# Patient Record
Sex: MALE | Race: WHITE | Employment: FULL TIME | URBAN - METROPOLITAN AREA
[De-identification: names, ages, dates, MRNs, and addresses within clinical notes are randomized per-mention and may not be internally consistent; named-entity substitution may affect disease eponyms.]

---

## 2017-05-03 ENCOUNTER — HOSPITAL ENCOUNTER (OUTPATIENT)
Dept: ORTHOPEDIC SURGERY | Age: 47
Discharge: HOME | End: 2017-05-03
Payer: COMMERCIAL

## 2017-05-03 DIAGNOSIS — M17.12 LOCALIZED PRIMARY OSTEOARTHRITIS OF LOWER LEG, LEFT: ICD-10-CM

## 2017-05-03 PROCEDURE — 73560 X-RAY EXAM OF KNEE 1 OR 2: CPT

## 2017-10-30 ENCOUNTER — OFFICE VISIT (OUTPATIENT)
Dept: FAMILY MEDICINE CLINIC | Age: 47
End: 2017-10-30

## 2017-10-30 VITALS
BODY MASS INDEX: 28.9 KG/M2 | RESPIRATION RATE: 20 BRPM | DIASTOLIC BLOOD PRESSURE: 80 MMHG | SYSTOLIC BLOOD PRESSURE: 120 MMHG | TEMPERATURE: 97.9 F | HEART RATE: 79 BPM | HEIGHT: 69 IN | WEIGHT: 195.1 LBS

## 2017-10-30 DIAGNOSIS — B07.9 VIRAL WARTS, UNSPECIFIED TYPE: Primary | ICD-10-CM

## 2017-10-30 DIAGNOSIS — Z23 NEED FOR INFLUENZA VACCINATION: ICD-10-CM

## 2017-10-30 PROCEDURE — 17000 DESTRUCT PREMALG LESION: CPT | Performed by: FAMILY MEDICINE

## 2017-10-30 ASSESSMENT — PATIENT HEALTH QUESTIONNAIRE - PHQ9
2. FEELING DOWN, DEPRESSED OR HOPELESS: 0
SUM OF ALL RESPONSES TO PHQ QUESTIONS 1-9: 0
1. LITTLE INTEREST OR PLEASURE IN DOING THINGS: 0
SUM OF ALL RESPONSES TO PHQ9 QUESTIONS 1 & 2: 0

## 2017-10-30 NOTE — PROGRESS NOTES
Subjective:      Patient ID: Alyssa Iverson is a 41 y.o. male. Chief Complaint   Patient presents with    Verruca Vulgaris     patient is presen today for Wart on Right thumb x 1 yr has tried burning it off no relief, has also tried OTC no relief, does have with pressure. HPI   Here today for a mass in his right thumb consult with a wart he's tried to have an burnt off by the Coumadin clinic several times states his current continues to come back once daily treated here today by us know otherwise he knows of      No Known Allergies  Outpatient Encounter Prescriptions as of 10/30/2017   Medication Sig Dispense Refill    [DISCONTINUED] ibuprofen (ADVIL;MOTRIN) 200 MG tablet Take 800 mg by mouth 2 times daily as needed for Pain      [DISCONTINUED] predniSONE (DELTASONE) 10 MG tablet Take 5 tabs x 4days then 4 x 4, 3 x 4, 2 x 4, 1 x 4 60 tablet 0     No facility-administered encounter medications on file as of 10/30/2017.       Social History     Social History    Marital status:      Spouse name: Genora Lesch     Number of children: 2    Years of education: N/A     Occupational History     [employer]     Social History Main Topics    Smoking status: Never Smoker    Smokeless tobacco: Never Used    Alcohol use Yes      Comment: 4-6    Drug use: No    Sexual activity: Yes     Partners: Female     Other Topics Concern    Not on file     Social History Narrative    No narrative on file     Family History   Problem Relation Age of Onset    Lupus Mother     Mental Illness Mother     High Blood Pressure Father     No Known Problems Sister     Asthma Son      Past Medical History:   Diagnosis Date    Arthritis     Knee pain, left 2/16/2015     Past Surgical History:   Procedure Laterality Date    ARTHROSCOPY / ARTHROTOMY KNEE Left 11/11/2016    LEFT KNEE ARTHROSCOPY / MEDIAL MENISECTOMY performed by Annel Ann MD at [address]

## 2017-11-30 ENCOUNTER — OFFICE VISIT (OUTPATIENT)
Dept: FAMILY MEDICINE CLINIC | Age: 47
End: 2017-11-30

## 2017-11-30 VITALS
BODY MASS INDEX: 29.03 KG/M2 | RESPIRATION RATE: 12 BRPM | HEART RATE: 70 BPM | HEIGHT: 69 IN | DIASTOLIC BLOOD PRESSURE: 86 MMHG | WEIGHT: 196 LBS | OXYGEN SATURATION: 96 % | SYSTOLIC BLOOD PRESSURE: 132 MMHG | TEMPERATURE: 97 F

## 2017-11-30 DIAGNOSIS — B07.8 OTHER VIRAL WARTS: Primary | ICD-10-CM

## 2017-11-30 DIAGNOSIS — Z23 NEEDS FLU SHOT: ICD-10-CM

## 2017-11-30 PROCEDURE — 17000 DESTRUCT PREMALG LESION: CPT | Performed by: FAMILY MEDICINE

## 2017-11-30 PROCEDURE — 90688 IIV4 VACCINE SPLT 0.5 ML IM: CPT | Performed by: FAMILY MEDICINE

## 2017-11-30 PROCEDURE — 90471 IMMUNIZATION ADMIN: CPT | Performed by: FAMILY MEDICINE

## 2017-12-27 ENCOUNTER — OFFICE VISIT (OUTPATIENT)
Dept: FAMILY MEDICINE CLINIC | Age: 47
End: 2017-12-27

## 2017-12-27 VITALS
HEIGHT: 69 IN | HEART RATE: 96 BPM | WEIGHT: 200.25 LBS | SYSTOLIC BLOOD PRESSURE: 118 MMHG | DIASTOLIC BLOOD PRESSURE: 80 MMHG | BODY MASS INDEX: 29.66 KG/M2 | TEMPERATURE: 97.2 F | RESPIRATION RATE: 16 BRPM | OXYGEN SATURATION: 96 %

## 2017-12-27 DIAGNOSIS — B07.8 OTHER VIRAL WARTS: Primary | ICD-10-CM

## 2017-12-27 PROCEDURE — 17110 DESTRUCTION B9 LES UP TO 14: CPT | Performed by: FAMILY MEDICINE

## 2017-12-27 ASSESSMENT — ENCOUNTER SYMPTOMS
ABDOMINAL PAIN: 0
SHORTNESS OF BREATH: 0
EYES NEGATIVE: 1
COUGH: 0
DIARRHEA: 0
CONSTIPATION: 0
NAUSEA: 0

## 2017-12-27 NOTE — PROGRESS NOTES
Female     Other Topics Concern    Not on file     Social History Narrative    No narrative on file     Family History   Problem Relation Age of Onset    Lupus Mother     Mental Illness Mother     High Blood Pressure Father     No Known Problems Sister     Asthma Son      No Known Allergies  Current Outpatient Prescriptions   Medication Sig Dispense Refill    Respiratory Therapy Supplies LUIS ANTONIO CPAP Supplies - needs mask, tubing, and filters. 2 Device 2     No current facility-administered medications for this visit. PMH, Surgical Hx, Family Hx, and Social Hx reviewed and updated. Health Maintenance reviewed. Objective    Vitals:    12/27/17 0751   BP: 118/80   Site: Left Arm   Position: Sitting   Cuff Size: Medium Adult   Pulse: 96   Resp: 16   Temp: 97.2 °F (36.2 °C)   TempSrc: Temporal   SpO2: 96%   Weight: 200 lb 4 oz (90.8 kg)   Height: 5' 9\" (1.753 m)       Physical Exam   Cardiovascular: Normal rate, regular rhythm and normal heart sounds. Pulmonary/Chest: Breath sounds normal.   Skin:   Small wart right thumb treated with liquid nitrogen in a freeze thaw freeze method. Tolerated well     Assessment & Plan   1. Other viral warts  UT DESTRUCTION BENIGN LESIONS UP TO 14     Orders Placed This Encounter   Procedures    UT DESTRUCTION BENIGN LESIONS UP TO 14       Long talk. Wart treatment reviewed. Okay to use Tylenol when necessary    Follow up as instructed. Call if any problems  Wellness visit in 2-3 weeks recommended  Controlled Substances Monitoring:          Joaquin Lopez MD          Please note this report has been partially produced using speech recognition software  And may cause contain errors related to that system including grammar, punctuation and spelling as well as words and phrases that may seem inappropriate. If there are questions or concerns please feel free to contact me to clarify.

## 2018-01-10 ENCOUNTER — OFFICE VISIT (OUTPATIENT)
Dept: FAMILY MEDICINE CLINIC | Age: 48
End: 2018-01-10

## 2018-01-10 VITALS
WEIGHT: 195 LBS | BODY MASS INDEX: 28.88 KG/M2 | HEIGHT: 69 IN | SYSTOLIC BLOOD PRESSURE: 110 MMHG | DIASTOLIC BLOOD PRESSURE: 84 MMHG | OXYGEN SATURATION: 96 % | HEART RATE: 65 BPM

## 2018-01-10 DIAGNOSIS — Z00.00 ENCOUNTER FOR GENERAL ADULT MEDICAL EXAMINATION W/O ABNORMAL FINDINGS: Primary | ICD-10-CM

## 2018-01-10 DIAGNOSIS — Z00.00 ENCOUNTER FOR GENERAL ADULT MEDICAL EXAMINATION W/O ABNORMAL FINDINGS: ICD-10-CM

## 2018-01-10 LAB
ALBUMIN SERPL-MCNC: 4.5 G/DL (ref 3.9–4.9)
ALP BLD-CCNC: 63 U/L (ref 35–104)
ALT SERPL-CCNC: 43 U/L (ref 0–41)
ANION GAP SERPL CALCULATED.3IONS-SCNC: 16 MEQ/L (ref 7–13)
ANISOCYTOSIS: ABNORMAL
AST SERPL-CCNC: 23 U/L (ref 0–40)
ATYPICAL LYMPHOCYTE RELATIVE PERCENT: 13 %
BASOPHILS ABSOLUTE: 0 K/UL (ref 0–0.2)
BASOPHILS RELATIVE PERCENT: 0.5 %
BILIRUB SERPL-MCNC: 0.4 MG/DL (ref 0–1.2)
BUN BLDV-MCNC: 15 MG/DL (ref 6–20)
CALCIUM SERPL-MCNC: 9.3 MG/DL (ref 8.6–10.2)
CHLORIDE BLD-SCNC: 99 MEQ/L (ref 98–107)
CHOLESTEROL, TOTAL: 191 MG/DL (ref 0–199)
CO2: 25 MEQ/L (ref 22–29)
CREAT SERPL-MCNC: 0.73 MG/DL (ref 0.7–1.2)
EOSINOPHILS ABSOLUTE: 0.1 K/UL (ref 0–0.7)
EOSINOPHILS RELATIVE PERCENT: 1 %
GFR AFRICAN AMERICAN: >60
GFR NON-AFRICAN AMERICAN: >60
GLOBULIN: 2.9 G/DL (ref 2.3–3.5)
GLUCOSE BLD-MCNC: 95 MG/DL (ref 74–109)
HCT VFR BLD CALC: 44.7 % (ref 42–52)
HDLC SERPL-MCNC: 68 MG/DL (ref 40–59)
HEMOGLOBIN: 14 G/DL (ref 14–18)
HYPOCHROMIA: ABNORMAL
LDL CHOLESTEROL CALCULATED: 104 MG/DL (ref 0–129)
LYMPHOCYTES ABSOLUTE: 2.3 K/UL (ref 1–4.8)
LYMPHOCYTES RELATIVE PERCENT: 24 %
MCH RBC QN AUTO: 22.1 PG (ref 27–31.3)
MCHC RBC AUTO-ENTMCNC: 31.2 % (ref 33–37)
MCV RBC AUTO: 70.7 FL (ref 80–100)
METAMYELOCYTES RELATIVE PERCENT: 1 %
MICROCYTES: ABNORMAL
MONOCYTES ABSOLUTE: 0.4 K/UL (ref 0.2–0.8)
MONOCYTES RELATIVE PERCENT: 5.9 %
NEUTROPHILS ABSOLUTE: 3.5 K/UL (ref 1.4–6.5)
NEUTROPHILS RELATIVE PERCENT: 56 %
PDW BLD-RTO: 15.3 % (ref 11.5–14.5)
PLATELET # BLD: 178 K/UL (ref 130–400)
PLATELET SLIDE REVIEW: ADEQUATE
POTASSIUM SERPL-SCNC: 4.9 MEQ/L (ref 3.5–5.1)
RBC # BLD: 6.32 M/UL (ref 4.7–6.1)
SMUDGE CELLS: 2.9
SODIUM BLD-SCNC: 140 MEQ/L (ref 132–144)
TOTAL PROTEIN: 7.4 G/DL (ref 6.4–8.1)
TRIGL SERPL-MCNC: 95 MG/DL (ref 0–200)
WBC # BLD: 6.1 K/UL (ref 4.8–10.8)

## 2018-01-10 PROCEDURE — 99396 PREV VISIT EST AGE 40-64: CPT | Performed by: FAMILY MEDICINE

## 2018-01-10 ASSESSMENT — ENCOUNTER SYMPTOMS
DIARRHEA: 0
NAUSEA: 0
COUGH: 0
SHORTNESS OF BREATH: 0
CONSTIPATION: 0
ABDOMINAL PAIN: 0
EYES NEGATIVE: 1

## 2018-01-10 NOTE — PROGRESS NOTES
Subjective  Eleanora Lanes, 41 y.o. male presents today with:  Chief Complaint   Patient presents with    Annual Exam     pt does not exercise daily, watches his diet, pt is fasting for BW           HPI    Patient presents today for a Wellness visit  Taking current medications which were reviewed. Problem list discussed. Eating okay. Watches his diet. Does not exercise regularly we spent some time talking about that    Overall doing well. Has 1 new problem / question. Would like wart rechecked on his thumb which was treated a few weeks ago. Seems to be doing better    Health Maintenance Is Up-To-Date          No other questions and or concerns for today's visit      Review of Systems   Constitutional: Negative for activity change, appetite change, fatigue and fever. HENT: Negative for ear pain. Eyes: Negative. Respiratory: Negative for cough and shortness of breath. Cardiovascular: Negative for chest pain and palpitations. Gastrointestinal: Negative for abdominal pain, constipation, diarrhea and nausea. Genitourinary: Negative for dysuria and frequency. Neurological: Negative for dizziness and headaches.          Past Medical History:   Diagnosis Date    Arthritis     Knee pain, left 2/16/2015     Past Surgical History:   Procedure Laterality Date    ARTHROSCOPY / ARTHROTOMY KNEE Left 11/11/2016    LEFT KNEE ARTHROSCOPY / MEDIAL MENISECTOMY performed by Rick Moser MD at [address] ARTHROSCOPY      OTHER SURGICAL HISTORY  12/08/14    DR WHEELER   LT THUMB MP JOINT ARTHRODES'S    ROTATOR CUFF REPAIR      ROTATOR CUFF REPAIR Right     WRIST SURGERY Left      Social History     Social History    Marital status:      Spouse name: Jeane Escobar     Number of children: 2    Years of education: N/A     Occupational History     [employer]     Social History Main Topics    Smoking status: Never Smoker    Smokeless tobacco: Never Used  Alcohol use Yes      Comment: 4-6    Drug use: No    Sexual activity: Yes     Partners: Female     Other Topics Concern    Not on file     Social History Narrative    No narrative on file     Family History   Problem Relation Age of Onset    Lupus Mother     Mental Illness Mother     High Blood Pressure Father     No Known Problems Sister     Asthma Son      No Known Allergies  Current Outpatient Prescriptions   Medication Sig Dispense Refill    Respiratory Therapy Supplies LUIS ANTONIO CPAP Supplies - needs mask, tubing, and filters. 2 Device 2     No current facility-administered medications for this visit. PMH, Surgical Hx, Family Hx, and Social Hx reviewed and updated. Health Maintenance reviewed. Objective    Vitals:    01/10/18 0749   BP: 110/84   Pulse: 65   SpO2: 96%   Weight: 195 lb (88.5 kg)   Height: 5' 9\" (1.753 m)       Physical Exam   Constitutional: He appears well-nourished. No distress. HENT:   Right Ear: External ear normal.   Left Ear: External ear normal.   Nose: Nose normal.   Mouth/Throat: Oropharynx is clear and moist.   Eyes: Conjunctivae are normal. Pupils are equal, round, and reactive to light. Neck: Neck supple. Carotid bruit is not present. No thyromegaly present. Cardiovascular: Normal rate, regular rhythm, normal heart sounds and normal pulses. No murmur heard. Pulmonary/Chest: Effort normal and breath sounds normal. He has no wheezes. Abdominal: Soft. Bowel sounds are normal. He exhibits no mass. There is no hepatomegaly. There is no tenderness. Musculoskeletal: Normal range of motion. He exhibits no edema. Lymphadenopathy:     He has no cervical adenopathy. Neurological: He is alert. Skin: Skin is warm. No rash noted. No cyanosis. Small wart right thumb treated with liquid nitrogen in a freeze thaw freeze method. Tolerated well   Psychiatric: He has a normal mood and affect.  His speech is normal and behavior is normal.       Assessment & Plan 1. Encounter for general adult medical examination w/o abnormal findings  Comprehensive Metabolic Panel    CBC With Auto Differential    Lipid Panel     Orders Placed This Encounter   Procedures    Comprehensive Metabolic Panel     Standing Status:   Future     Number of Occurrences:   1     Standing Expiration Date:   1/10/2019    CBC With Auto Differential     Standing Status:   Future     Number of Occurrences:   1     Standing Expiration Date:   1/10/2019    Lipid Panel     Standing Status:   Future     Number of Occurrences:   1     Standing Expiration Date:   1/10/2019     Order Specific Question:   Is Patient Fasting?/# of Hours     Answer:   yes     No orders of the defined types were placed in this encounter. Long talk. Health maintenance issues reviewed and discussed with recommendations made. The importance of a good diet and exercise regimen discussed. Take medications as prescribed. Follow up as instructed. Call if any problems  Controlled Substances Monitoring:          Komal Yap MD          Please note this report has been partially produced using speech recognition software  And may cause contain errors related to that system including grammar, punctuation and spelling as well as words and phrases that may seem inappropriate. If there are questions or concerns please feel free to contact me to clarify.

## 2018-07-06 ENCOUNTER — TELEPHONE (OUTPATIENT)
Dept: FAMILY MEDICINE CLINIC | Age: 48
End: 2018-07-06

## 2018-07-06 DIAGNOSIS — M77.32 HEEL SPUR, LEFT: Primary | ICD-10-CM

## 2018-07-06 NOTE — TELEPHONE ENCOUNTER
Lmom for pt that referral was placed and that he can call Dr Cristhian Alex to set up appt (they will call him too)

## 2018-07-11 ENCOUNTER — OFFICE VISIT (OUTPATIENT)
Dept: PODIATRY | Age: 48
End: 2018-07-11
Payer: COMMERCIAL

## 2018-07-11 VITALS
RESPIRATION RATE: 14 BRPM | HEIGHT: 69 IN | BODY MASS INDEX: 28.88 KG/M2 | TEMPERATURE: 97.3 F | HEART RATE: 82 BPM | SYSTOLIC BLOOD PRESSURE: 130 MMHG | WEIGHT: 195 LBS | OXYGEN SATURATION: 97 % | DIASTOLIC BLOOD PRESSURE: 78 MMHG

## 2018-07-11 DIAGNOSIS — M79.672 CHRONIC HEEL PAIN, LEFT: Primary | ICD-10-CM

## 2018-07-11 DIAGNOSIS — G89.29 CHRONIC HEEL PAIN, LEFT: Primary | ICD-10-CM

## 2018-07-11 PROCEDURE — 99203 OFFICE O/P NEW LOW 30 MIN: CPT | Performed by: PODIATRIST

## 2018-07-11 RX ORDER — METHYLPREDNISOLONE 4 MG/1
TABLET ORAL
Qty: 1 KIT | Refills: 0 | Status: SHIPPED | OUTPATIENT
Start: 2018-07-11 | End: 2018-07-17

## 2018-07-11 RX ORDER — MELOXICAM 15 MG/1
15 TABLET ORAL DAILY
Qty: 30 TABLET | Refills: 3 | Status: SHIPPED | OUTPATIENT
Start: 2018-07-11 | End: 2019-02-19 | Stop reason: ALTCHOICE

## 2019-02-18 ENCOUNTER — TELEPHONE (OUTPATIENT)
Dept: FAMILY MEDICINE CLINIC | Age: 49
End: 2019-02-18

## 2019-02-19 ENCOUNTER — OFFICE VISIT (OUTPATIENT)
Dept: FAMILY MEDICINE CLINIC | Age: 49
End: 2019-02-19
Payer: COMMERCIAL

## 2019-02-19 VITALS
DIASTOLIC BLOOD PRESSURE: 86 MMHG | HEART RATE: 64 BPM | TEMPERATURE: 97.3 F | SYSTOLIC BLOOD PRESSURE: 124 MMHG | RESPIRATION RATE: 12 BRPM | HEIGHT: 69 IN | WEIGHT: 198.4 LBS | BODY MASS INDEX: 29.38 KG/M2

## 2019-02-19 DIAGNOSIS — M77.8 ELBOW TENDINITIS: Primary | ICD-10-CM

## 2019-02-19 DIAGNOSIS — M79.601 RIGHT ARM PAIN: ICD-10-CM

## 2019-02-19 DIAGNOSIS — B07.9 VIRAL WARTS, UNSPECIFIED TYPE: ICD-10-CM

## 2019-02-19 PROCEDURE — 99213 OFFICE O/P EST LOW 20 MIN: CPT | Performed by: FAMILY MEDICINE

## 2019-02-19 RX ORDER — METHYLPREDNISOLONE 4 MG/1
TABLET ORAL
Qty: 1 KIT | Refills: 0 | Status: SHIPPED | OUTPATIENT
Start: 2019-02-19 | End: 2021-02-25

## 2019-02-19 ASSESSMENT — PATIENT HEALTH QUESTIONNAIRE - PHQ9
SUM OF ALL RESPONSES TO PHQ9 QUESTIONS 1 & 2: 0
SUM OF ALL RESPONSES TO PHQ QUESTIONS 1-9: 0
2. FEELING DOWN, DEPRESSED OR HOPELESS: 0
1. LITTLE INTEREST OR PLEASURE IN DOING THINGS: 0
SUM OF ALL RESPONSES TO PHQ QUESTIONS 1-9: 0

## 2019-02-19 ASSESSMENT — ENCOUNTER SYMPTOMS
COUGH: 0
NAUSEA: 0
CONSTIPATION: 0
DIARRHEA: 0
EYES NEGATIVE: 1
ABDOMINAL PAIN: 0
SHORTNESS OF BREATH: 0

## 2021-02-25 ENCOUNTER — OFFICE VISIT (OUTPATIENT)
Dept: FAMILY MEDICINE CLINIC | Age: 51
End: 2021-02-25
Payer: COMMERCIAL

## 2021-02-25 VITALS
DIASTOLIC BLOOD PRESSURE: 74 MMHG | HEIGHT: 70 IN | WEIGHT: 197.4 LBS | OXYGEN SATURATION: 100 % | TEMPERATURE: 97.9 F | BODY MASS INDEX: 28.26 KG/M2 | HEART RATE: 77 BPM | SYSTOLIC BLOOD PRESSURE: 120 MMHG

## 2021-02-25 DIAGNOSIS — M72.2 PLANTAR FASCIITIS OF RIGHT FOOT: Primary | ICD-10-CM

## 2021-02-25 PROCEDURE — 99202 OFFICE O/P NEW SF 15 MIN: CPT | Performed by: FAMILY MEDICINE

## 2021-02-25 RX ORDER — METHYLPREDNISOLONE 4 MG/1
TABLET ORAL
Qty: 1 KIT | Refills: 0 | Status: SHIPPED | OUTPATIENT
Start: 2021-02-25 | End: 2021-03-03

## 2021-02-25 SDOH — ECONOMIC STABILITY: INCOME INSECURITY: HOW HARD IS IT FOR YOU TO PAY FOR THE VERY BASICS LIKE FOOD, HOUSING, MEDICAL CARE, AND HEATING?: NOT HARD AT ALL

## 2021-02-25 ASSESSMENT — ENCOUNTER SYMPTOMS
SORE THROAT: 0
DIARRHEA: 0
WHEEZING: 0
CONSTIPATION: 0
ABDOMINAL PAIN: 0
SHORTNESS OF BREATH: 0
RHINORRHEA: 0
COUGH: 0

## 2021-02-25 ASSESSMENT — PATIENT HEALTH QUESTIONNAIRE - PHQ9
SUM OF ALL RESPONSES TO PHQ QUESTIONS 1-9: 0
2. FEELING DOWN, DEPRESSED OR HOPELESS: 0
1. LITTLE INTEREST OR PLEASURE IN DOING THINGS: 0

## 2021-02-25 NOTE — PROGRESS NOTES
[address] [facility] [address] [facility]  [address] [address]  Dept: [phone]  Dept Fax: [phone]  Loc: [phone]     Chief Complaint  Chief Complaint   Patient presents with   Georgianna Olszewski [facility]    Foot Pain     x right, years, states he has a spur        HPI:  52 y.o.male who presents for the following:  (establish; was seeing Dr. 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 Has had injections and uses orthotics and seen podiatry. Review of Systems   Constitutional: Negative for chills and fever. HENT: Negative for congestion, rhinorrhea and sore throat. Respiratory: Negative for cough, shortness of breath and wheezing. Gastrointestinal: Negative for abdominal pain, constipation and diarrhea. Endocrine: Negative for polydipsia and polyuria. Genitourinary: Negative for dysuria, frequency and urgency. Neurological: Negative for syncope, light-headedness, numbness and headaches. Psychiatric/Behavioral: Negative for sleep disturbance. The patient is not nervous/anxious.         Past Medical History:   Diagnosis Date    Arthritis     Knee pain, left 2/16/2015     Past Surgical History:   Procedure Laterality Date    ARTHROSCOPY / ARTHROTOMY KNEE Left 11/11/2016    LEFT KNEE ARTHROSCOPY / MEDIAL MENISECTOMY performed by Vignesh Bhat MD at [address] ARTHROSCOPY      OTHER SURGICAL HISTORY  12/08/14    DR WHEELER   LT THUMB MP JOINT ARTHRODES'S    ROTATOR CUFF REPAIR      ROTATOR CUFF REPAIR Right     WRIST SURGERY Left      Social History     Socioeconomic History    Marital status:      Spouse name: Jasson Martínez     Number of children: 2    Years of education: Not on file    Highest education level: Not on file Occupational History     Employer: [employer]   Social Needs    Financial resource strain: Not hard at all   [place] insecurity     Worry: Never true     Inability: Never true   [employer] needs     Medical: No     Non-medical: No   Tobacco Use    Smoking status: Never Smoker    Smokeless tobacco: Never Used   Substance and Sexual Activity    Alcohol use: Yes     Comment: 4-6    Drug use: No    Sexual activity: Yes     Partners: Female   Lifestyle    Physical activity     Days per week: Not on file     Minutes per session: Not on file    Stress: Not on file   Relationships    Social connections     Talks on phone: Not on file     Gets together: Not on file     Attends Congregational service: Not on file     Active member of club or organization: Not on file     Attends meetings of clubs or organizations: Not on file     Relationship status: Not on file    Intimate partner violence     Fear of current or ex partner: Not on file     Emotionally abused: Not on file     Physically abused: Not on file     Forced sexual activity: Not on file   Other Topics Concern    Not on file   Social History Narrative    Not on file     Family History   Problem Relation Age of Onset    Lupus Mother     Mental Illness Mother     High Blood Pressure Father     No Known Problems Sister     Asthma Son       No Known Allergies  Current Outpatient Medications   Medication Sig Dispense Refill    methylPREDNISolone (MEDROL DOSEPACK) 4 MG tablet Take by mouth. 1 kit 0    Respiratory Therapy Supplies LUIS ANTONIO CPAP Supplies - needs mask, tubing, and filters. 2 Device 2     No current facility-administered medications for this visit.           Vitals:    02/25/21 0906   BP: 120/74   Site: Left Upper Arm   Position: Sitting   Cuff Size: Large Adult   Pulse: 77   Temp: 97.9 °F (36.6 °C)   TempSrc: Infrared   SpO2: 100%   Weight: 197 lb 6.4 oz (89.5 kg)   Height: 5' 9.75\" (1.772 m)       Physical exam:  Physical Exam  Vitals signs reviewed. Constitutional:       General: He is not in acute distress. Appearance: He is well-developed. HENT:      Head: Normocephalic and atraumatic. Neck:      Musculoskeletal: Normal range of motion. Cardiovascular:      Rate and Rhythm: Normal rate. Pulmonary:      Effort: Pulmonary effort is normal. No respiratory distress. Skin:     General: Skin is warm and dry. Neurological:      Mental Status: He is alert and oriented to person, place, and time. Psychiatric:         Behavior: Behavior normal.         Assessment/Plan:  52 y.o. male here mainly for foot pain:  - Foot pain: he prefers just to try a steroid today; provided medrol pack. Can consider podiatry again in the future     Diagnosis Orders   1. Plantar fasciitis of right foot  methylPREDNISolone (MEDROL DOSEPACK) 4 MG tablet        Return if symptoms worsen or fail to improve.     Jerry Morse MD

## 2021-05-07 ENCOUNTER — HOSPITAL ENCOUNTER (OUTPATIENT)
Age: 51
Setting detail: SPECIMEN
Discharge: HOME | End: 2021-05-07
Payer: COMMERCIAL

## 2021-05-07 ENCOUNTER — PROCEDURE VISIT (OUTPATIENT)
Dept: FAMILY MEDICINE CLINIC | Age: 51
End: 2021-05-07
Payer: COMMERCIAL

## 2021-05-07 VITALS
DIASTOLIC BLOOD PRESSURE: 78 MMHG | WEIGHT: 184.6 LBS | HEART RATE: 89 BPM | TEMPERATURE: 97.4 F | OXYGEN SATURATION: 99 % | SYSTOLIC BLOOD PRESSURE: 110 MMHG | BODY MASS INDEX: 26.68 KG/M2

## 2021-05-07 DIAGNOSIS — L72.9 SKIN CYST: Primary | ICD-10-CM

## 2021-05-07 PROCEDURE — 88304 TISSUE EXAM BY PATHOLOGIST: CPT

## 2021-05-07 PROCEDURE — 99214 OFFICE O/P EST MOD 30 MIN: CPT | Performed by: FAMILY MEDICINE

## 2021-05-07 PROCEDURE — 11402 EXC TR-EXT B9+MARG 1.1-2 CM: CPT | Performed by: FAMILY MEDICINE

## 2021-05-07 ASSESSMENT — ENCOUNTER SYMPTOMS
CONSTIPATION: 0
COUGH: 0
WHEEZING: 0
SORE THROAT: 0
RHINORRHEA: 0
DIARRHEA: 0
SHORTNESS OF BREATH: 0
ABDOMINAL PAIN: 0

## 2021-05-07 NOTE — PROGRESS NOTES
Inability: Never true    Transportation needs     Medical: No     Non-medical: No   Tobacco Use    Smoking status: Never Smoker    Smokeless tobacco: Never Used   Substance and Sexual Activity    Alcohol use: Yes     Comment: 4-6    Drug use: No    Sexual activity: Yes     Partners: Female   Lifestyle    Physical activity     Days per week: Not on file     Minutes per session: Not on file    Stress: Not on file   Relationships    Social connections     Talks on phone: Not on file     Gets together: Not on file     Attends Denominational service: Not on file     Active member of club or organization: Not on file     Attends meetings of clubs or organizations: Not on file     Relationship status: Not on file    Intimate partner violence     Fear of current or ex partner: Not on file     Emotionally abused: Not on file     Physically abused: Not on file     Forced sexual activity: Not on file   Other Topics Concern    Not on file   Social History Narrative    Not on file     Family History   Problem Relation Age of Onset    Lupus Mother     Mental Illness Mother     High Blood Pressure Father     No Known Problems Sister     Asthma Son       No Known Allergies  Current Outpatient Medications   Medication Sig Dispense Refill    Respiratory Therapy Supplies LUIS ANTONIO CPAP Supplies - needs mask, tubing, and filters. 2 Device 2     No current facility-administered medications for this visit. Vitals:    05/07/21 1506   BP: 110/78   Site: Right Upper Arm   Position: Sitting   Cuff Size: Large Adult   Pulse: 89   Temp: 97.4 °F (36.3 °C)   TempSrc: Infrared   SpO2: 99%   Weight: 184 lb 9.6 oz (83.7 kg)       Physical exam:  Physical Exam  Vitals signs reviewed. Constitutional:       General: He is not in acute distress. Appearance: He is well-developed. HENT:      Head: Normocephalic and atraumatic. Mouth/Throat:      Pharynx: No oropharyngeal exudate.    Neck:      Musculoskeletal: Normal range of motion. Thyroid: No thyromegaly. Cardiovascular:      Rate and Rhythm: Normal rate and regular rhythm. Heart sounds: Normal heart sounds. No murmur. Pulmonary:      Effort: Pulmonary effort is normal. No respiratory distress. Breath sounds: Normal breath sounds. No wheezing. Abdominal:      General: There is no distension. Palpations: Abdomen is soft. Tenderness: There is no abdominal tenderness. There is no guarding or rebound. Musculoskeletal:        Arms:    Lymphadenopathy:      Cervical: No cervical adenopathy. Skin:     General: Skin is warm and dry. Neurological:      Mental Status: He is alert and oriented to person, place, and time. Psychiatric:         Behavior: Behavior normal.         Assessment/Plan:  52 y.o. male here mainly for skin cyst:  - Likely epidermoid cyst; Skin removal today sent to path; can remove sutures in 10-14 days (on his own or return to clinic); remove dressing tomorrow morning; steristrips will fall of on their own    Procedure: Removal of cyst from back:  Discussed risks/benefits of procedure. After verbal consent, timeout was performed. Area prepped in the usual fashion. 2%Lidocaine/EPI injection subcutaneously at site. An 15 blade used to make an ellipsoid incision (2.5cm length) over the lesion. The cyst was removed in 1 piece. Hemostasis achieved with pressure. 3-0 ethilon suture was used to place intradermal sutures. 4-0 ethilon was used for skin closure. The area was dressed with steristrips and guaze. Patient tolerated the procedure without complication. Diagnosis Orders   1. Skin cyst  Surgical Pathology    41550 - ND EXC SKIN BENIG 2.1-3CM TRUNK,ARM,LEG        Return if symptoms worsen or fail to improve.     Jose Antonio Warren MD

## 2021-05-18 ENCOUNTER — OFFICE VISIT (OUTPATIENT)
Dept: FAMILY MEDICINE CLINIC | Age: 51
End: 2021-05-18
Payer: COMMERCIAL

## 2021-05-18 VITALS
SYSTOLIC BLOOD PRESSURE: 110 MMHG | OXYGEN SATURATION: 98 % | BODY MASS INDEX: 27.85 KG/M2 | TEMPERATURE: 97.3 F | HEART RATE: 76 BPM | DIASTOLIC BLOOD PRESSURE: 80 MMHG | HEIGHT: 69 IN | WEIGHT: 188 LBS

## 2021-05-18 DIAGNOSIS — Z48.02 ENCOUNTER FOR REMOVAL OF SUTURES: ICD-10-CM

## 2021-05-18 DIAGNOSIS — L72.9 SKIN CYST: Primary | ICD-10-CM

## 2021-05-18 PROCEDURE — 99212 OFFICE O/P EST SF 10 MIN: CPT | Performed by: FAMILY MEDICINE

## 2021-05-18 ASSESSMENT — ENCOUNTER SYMPTOMS
WHEEZING: 0
ABDOMINAL PAIN: 0
CONSTIPATION: 0
DIARRHEA: 0
COUGH: 0
RHINORRHEA: 0
SHORTNESS OF BREATH: 0
SORE THROAT: 0

## 2021-05-18 NOTE — PROGRESS NOTES
Alcohol use: Yes     Comment: 4-6    Drug use: No    Sexual activity: Yes     Partners: Female   Other Topics Concern    Not on file   Social History Narrative    Not on file     Social Determinants of Health     Financial Resource Strain: Low Risk     Difficulty of Paying Living Expenses: Not hard at all   Food Insecurity: No Food Insecurity    Worried About Running Out of Food in the Last Year: Never true    Kimberley of Food in the Last Year: Never true   Transportation Needs: No Transportation Needs    Lack of Transportation (Medical): No    Lack of Transportation (Non-Medical): No   Physical Activity:     Days of Exercise per Week:     Minutes of Exercise per Session:    Stress:     Feeling of Stress :    Social Connections:     Frequency of Communication with Friends and Family:     Frequency of Social Gatherings with Friends and Family:     Attends Christianity Services:     Active Member of Clubs or Organizations:     Attends Club or Organization Meetings:     Marital Status:    Intimate Partner Violence:     Fear of Current or Ex-Partner:     Emotionally Abused:     Physically Abused:     Sexually Abused:      Family History   Problem Relation Age of Onset    Lupus Mother     Mental Illness Mother     High Blood Pressure Father     No Known Problems Sister     Asthma Son       No Known Allergies  Current Outpatient Medications   Medication Sig Dispense Refill    Respiratory Therapy Supplies LUIS ANTONIO CPAP Supplies - needs mask, tubing, and filters. 2 Device 2     No current facility-administered medications for this visit. Vitals:    05/18/21 0811   BP: 110/80   Pulse: 76   Temp: 97.3 °F (36.3 °C)   SpO2: 98%   Weight: 188 lb (85.3 kg)   Height: 5' 9\" (1.753 m)       Physical exam:  Physical Exam  Vitals reviewed. Constitutional:       General: He is not in acute distress. Appearance: He is well-developed. HENT:      Head: Normocephalic and atraumatic.    Cardiovascular: Rate and Rhythm: Normal rate. Pulmonary:      Effort: Pulmonary effort is normal. No respiratory distress. Musculoskeletal:      Cervical back: Normal range of motion. Back:    Skin:     General: Skin is warm and dry. Neurological:      Mental Status: He is alert and oriented to person, place, and time. Psychiatric:         Behavior: Behavior normal.         Assessment/Plan:  52 y.o. male here mainly for suture removal:  - sutures removed x7; can f/u prn if issues     Diagnosis Orders   1. Skin cyst     2. Encounter for removal of sutures          Return if symptoms worsen or fail to improve.     Moon Bronson MD

## 2021-11-10 ENCOUNTER — HOSPITAL ENCOUNTER (OUTPATIENT)
Age: 51
Discharge: HOME | End: 2021-11-12
Payer: COMMERCIAL

## 2021-11-10 ENCOUNTER — HOSPITAL ENCOUNTER (OUTPATIENT)
Dept: GENERAL RADIOLOGY | Age: 51
Discharge: HOME | End: 2021-11-12
Payer: COMMERCIAL

## 2021-11-10 DIAGNOSIS — M25.512 PAIN IN JOINT OF LEFT SHOULDER: ICD-10-CM

## 2021-11-10 PROCEDURE — 73030 X-RAY EXAM OF SHOULDER: CPT

## 2021-12-09 ENCOUNTER — HOSPITAL ENCOUNTER (OUTPATIENT)
Dept: MRI IMAGING | Age: 51
Discharge: HOME | End: 2021-12-11
Payer: COMMERCIAL

## 2021-12-09 DIAGNOSIS — M25.519 ARTHRALGIA OF SHOULDER, UNSPECIFIED LATERALITY: ICD-10-CM

## 2021-12-09 PROCEDURE — 73221 MRI JOINT UPR EXTREM W/O DYE: CPT

## 2022-12-01 ENCOUNTER — OFFICE VISIT (OUTPATIENT)
Dept: FAMILY MEDICINE CLINIC | Age: 52
End: 2022-12-01
Payer: COMMERCIAL

## 2022-12-01 ENCOUNTER — HOSPITAL ENCOUNTER (OUTPATIENT)
Age: 52
Setting detail: SPECIMEN
Discharge: HOME | End: 2022-12-01
Payer: COMMERCIAL

## 2022-12-01 ENCOUNTER — TELEPHONE (OUTPATIENT)
Dept: FAMILY MEDICINE CLINIC | Age: 52
End: 2022-12-01

## 2022-12-01 VITALS
TEMPERATURE: 98 F | BODY MASS INDEX: 29.18 KG/M2 | HEART RATE: 68 BPM | SYSTOLIC BLOOD PRESSURE: 100 MMHG | HEIGHT: 69 IN | DIASTOLIC BLOOD PRESSURE: 58 MMHG | OXYGEN SATURATION: 95 % | WEIGHT: 197 LBS

## 2022-12-01 DIAGNOSIS — Z11.59 ENCOUNTER FOR HEPATITIS C SCREENING TEST FOR LOW RISK PATIENT: ICD-10-CM

## 2022-12-01 DIAGNOSIS — Z00.00 ANNUAL PHYSICAL EXAM: Primary | ICD-10-CM

## 2022-12-01 DIAGNOSIS — Z11.4 ENCOUNTER FOR SCREENING FOR HIV: ICD-10-CM

## 2022-12-01 DIAGNOSIS — Z00.00 ANNUAL PHYSICAL EXAM: ICD-10-CM

## 2022-12-01 LAB
ALBUMIN SERPL-MCNC: 4.4 G/DL (ref 3.5–4.6)
ALP BLD-CCNC: 58 U/L (ref 35–104)
ALT SERPL-CCNC: 42 U/L (ref 0–41)
ANION GAP SERPL CALCULATED.3IONS-SCNC: 13 MEQ/L (ref 9–15)
AST SERPL-CCNC: 30 U/L (ref 0–40)
BILIRUB SERPL-MCNC: 0.3 MG/DL (ref 0.2–0.7)
BUN BLDV-MCNC: 16 MG/DL (ref 6–20)
CALCIUM SERPL-MCNC: 9.2 MG/DL (ref 8.5–9.9)
CHLORIDE BLD-SCNC: 98 MEQ/L (ref 95–107)
CHOLESTEROL, TOTAL: 189 MG/DL (ref 0–199)
CO2: 25 MEQ/L (ref 20–31)
CREAT SERPL-MCNC: 0.85 MG/DL (ref 0.7–1.2)
GFR SERPL CREATININE-BSD FRML MDRD: >60 ML/MIN/{1.73_M2}
GLOBULIN: 2.6 G/DL (ref 2.3–3.5)
GLUCOSE BLD-MCNC: 139 MG/DL (ref 70–99)
HBA1C MFR BLD: 6.1 % (ref 4.8–5.9)
HDLC SERPL-MCNC: 72 MG/DL (ref 40–59)
LDL CHOLESTEROL CALCULATED: 98 MG/DL (ref 0–129)
POTASSIUM SERPL-SCNC: 4.1 MEQ/L (ref 3.4–4.9)
SODIUM BLD-SCNC: 136 MEQ/L (ref 135–144)
TOTAL PROTEIN: 7 G/DL (ref 6.3–8)
TRIGL SERPL-MCNC: 93 MG/DL (ref 0–150)

## 2022-12-01 PROCEDURE — 80061 LIPID PANEL: CPT

## 2022-12-01 PROCEDURE — 36415 COLL VENOUS BLD VENIPUNCTURE: CPT | Performed by: FAMILY MEDICINE

## 2022-12-01 PROCEDURE — 99396 PREV VISIT EST AGE 40-64: CPT | Performed by: FAMILY MEDICINE

## 2022-12-01 PROCEDURE — 83036 HEMOGLOBIN GLYCOSYLATED A1C: CPT

## 2022-12-01 PROCEDURE — 86803 HEPATITIS C AB TEST: CPT

## 2022-12-01 PROCEDURE — 80053 COMPREHEN METABOLIC PANEL: CPT

## 2022-12-01 PROCEDURE — 87389 HIV-1 AG W/HIV-1&-2 AB AG IA: CPT

## 2022-12-01 SDOH — ECONOMIC STABILITY: FOOD INSECURITY: WITHIN THE PAST 12 MONTHS, YOU WORRIED THAT YOUR FOOD WOULD RUN OUT BEFORE YOU GOT MONEY TO BUY MORE.: NEVER TRUE

## 2022-12-01 SDOH — ECONOMIC STABILITY: FOOD INSECURITY: WITHIN THE PAST 12 MONTHS, THE FOOD YOU BOUGHT JUST DIDN'T LAST AND YOU DIDN'T HAVE MONEY TO GET MORE.: NEVER TRUE

## 2022-12-01 ASSESSMENT — ENCOUNTER SYMPTOMS
COUGH: 0
ABDOMINAL PAIN: 0
SORE THROAT: 0
DIARRHEA: 0
RHINORRHEA: 0
WHEEZING: 0
CONSTIPATION: 0
SHORTNESS OF BREATH: 0

## 2022-12-01 ASSESSMENT — PATIENT HEALTH QUESTIONNAIRE - PHQ9
SUM OF ALL RESPONSES TO PHQ QUESTIONS 1-9: 0
SUM OF ALL RESPONSES TO PHQ9 QUESTIONS 1 & 2: 0
SUM OF ALL RESPONSES TO PHQ QUESTIONS 1-9: 0
SUM OF ALL RESPONSES TO PHQ QUESTIONS 1-9: 0
2. FEELING DOWN, DEPRESSED OR HOPELESS: 0
SUM OF ALL RESPONSES TO PHQ QUESTIONS 1-9: 0
1. LITTLE INTEREST OR PLEASURE IN DOING THINGS: 0

## 2022-12-01 ASSESSMENT — SOCIAL DETERMINANTS OF HEALTH (SDOH): HOW HARD IS IT FOR YOU TO PAY FOR THE VERY BASICS LIKE FOOD, HOUSING, MEDICAL CARE, AND HEATING?: NOT HARD AT ALL

## 2022-12-01 NOTE — TELEPHONE ENCOUNTER
Patient forgot to ask the provider at his appointment today that since he is 50 now, does he need to get a colonoscopy scheduled?

## 2022-12-01 NOTE — PROGRESS NOTES
[address] [facility] [address] [place] [address] PRIMARY CARE  [address] [place] [address]  Dept: [phone]  Dept Fax: : [phone]     Chief Complaint  Chief Complaint   Patient presents with    Annual Exam     Not fasting for labs     Suture / Staple Removal    Other     Wart removal       HPI:  50 y.o.male who presents for the following:      Works in construction; nonsmoker; has a wart on the L thumb he would like removed; had a cyst removed from the back last year and part of a suture is sticking out he would like snipped    Review of Systems   Constitutional:  Negative for chills and fever. HENT:  Negative for congestion, rhinorrhea and sore throat. Respiratory:  Negative for cough, shortness of breath and wheezing. Gastrointestinal:  Negative for abdominal pain, constipation and diarrhea. Endocrine: Negative for polydipsia and polyuria. Genitourinary:  Negative for dysuria, frequency and urgency. Neurological:  Negative for syncope, light-headedness, numbness and headaches. Psychiatric/Behavioral:  Negative for sleep disturbance. The patient is not nervous/anxious.       Past Medical History:   Diagnosis Date    Arthritis     Knee pain, left 2/16/2015     Past Surgical History:   Procedure Laterality Date    ARTHROSCOPY / ARTHROTOMY KNEE Left 11/11/2016    LEFT KNEE ARTHROSCOPY / MEDIAL MENISECTOMY performed by Esdras Hunter MD at [place] [address] ARTHROSCOPY      OTHER SURGICAL HISTORY  12/08/14    DR WHEELER   LT THUMB MP JOINT ARTHRODES'S    ROTATOR CUFF REPAIR      ROTATOR CUFF REPAIR Right     WRIST SURGERY Left      Social History     Socioeconomic History    Marital status:      Spouse name: Michael Blocker     Number of children: 2    Years of education: Not on file    Highest education level: Not on file   Occupational History     Employer: [employer]   Tobacco Use    Smoking status: Never    Smokeless tobacco: Never   Vaping Use    Vaping Use: Never used   Substance and Sexual Activity    Alcohol use: Yes     Comment: 4-6    Drug use: No    Sexual activity: Yes     Partners: Female   Other Topics Concern    Not on file   Social History Narrative    Not on file     Social Determinants of Health     Financial Resource Strain: Low Risk     Difficulty of Paying Living Expenses: Not hard at all   Food Insecurity: No Food Insecurity    Worried About Running Out of Food in the Last Year: Never true    Ran Out of Food in the Last Year: Never true   Transportation Needs: Not on file   Physical Activity: Not on file   Stress: Not on file   Social Connections: Not on file   Intimate Partner Violence: Not on file   Housing Stability: Not on file     Family History   Problem Relation Age of Onset    Lupus Mother     Mental Illness Mother     High Blood Pressure Father     No Known Problems Sister     Asthma Son       No Known Allergies  Current Outpatient Medications   Medication Sig Dispense Refill    Respiratory Therapy Supplies LUIS ANTONIO CPAP Supplies - needs mask, tubing, and filters. 2 Device 2     No current facility-administered medications for this visit. Vitals:    12/01/22 1400   BP: (!) 100/58   Site: Left Upper Arm   Position: Sitting   Cuff Size: Medium Adult   Pulse: 68   Temp: 98 °F (36.7 °C)   TempSrc: Infrared   SpO2: 95%   Weight: 197 lb (89.4 kg)   Height: 5' 9\" (1.753 m)       Physical exam:  Physical Exam  Vitals reviewed. Constitutional:       General: He is not in acute distress. Appearance: He is well-developed. HENT:      Head: Normocephalic and atraumatic. Right Ear: Tympanic membrane, ear canal and external ear normal.      Left Ear: Tympanic membrane, ear canal and external ear normal.      Mouth/Throat:      Pharynx: No oropharyngeal exudate. Neck:      Thyroid: No thyromegaly. Cardiovascular:      Rate and Rhythm: Normal rate and regular rhythm. Heart sounds: Normal heart sounds. No murmur heard. Pulmonary:      Effort: Pulmonary effort is normal. No respiratory distress. Breath sounds: Normal breath sounds. No wheezing. Abdominal:      General: There is no distension. Palpations: Abdomen is soft. Tenderness: There is no abdominal tenderness. There is no guarding or rebound. Musculoskeletal:      Cervical back: Normal range of motion. Lymphadenopathy:      Cervical: No cervical adenopathy. Skin:     General: Skin is warm and dry. Neurological:      Mental Status: He is alert and oriented to person, place, and time. Psychiatric:         Behavior: Behavior normal.       Assessment/Plan:  50 y.o. male here mainly for annual:  - routine labs and screenings   - Suture end was lifted and cut short  - Hand wart: we are out of liquid nitrogen today; will refill and have him return later     Diagnosis Orders   1. Annual physical exam  Hemoglobin A1C    Lipid Panel    Comprehensive Metabolic Panel      2. Encounter for hepatitis C screening test for low risk patient  Hepatitis C Antibody      3.  Encounter for screening for HIV  HIV Screen           Return in about 1 year (around 12/1/2023) for annual exam.    Diana Mi MD

## 2022-12-02 DIAGNOSIS — Z12.11 COLON CANCER SCREENING: Primary | ICD-10-CM

## 2022-12-02 PROBLEM — R73.03 PREDIABETES: Status: ACTIVE | Noted: 2022-12-02

## 2022-12-03 LAB
HEPATITIS C ANTIBODY: NONREACTIVE
HIV AG/AB: NONREACTIVE

## 2023-01-03 RX ORDER — POLYETHYLENE GLYCOL 3350, SODIUM CHLORIDE, SODIUM BICARBONATE, POTASSIUM CHLORIDE 420; 11.2; 5.72; 1.48 G/4L; G/4L; G/4L; G/4L
4000 POWDER, FOR SOLUTION ORAL ONCE
Qty: 4000 ML | Refills: 0 | Status: SHIPPED | OUTPATIENT
Start: 2023-01-03 | End: 2023-01-03

## 2023-01-25 ENCOUNTER — ANESTHESIA EVENT (OUTPATIENT)
Dept: ENDOSCOPY | Age: 53
End: 2023-01-25
Payer: COMMERCIAL

## 2023-01-25 ENCOUNTER — ANESTHESIA (OUTPATIENT)
Dept: ENDOSCOPY | Age: 53
End: 2023-01-25
Payer: COMMERCIAL

## 2023-01-25 ENCOUNTER — HOSPITAL ENCOUNTER (OUTPATIENT)
Age: 53
Setting detail: OUTPATIENT SURGERY
Discharge: HOME | End: 2023-01-25
Attending: INTERNAL MEDICINE | Admitting: INTERNAL MEDICINE
Payer: COMMERCIAL

## 2023-01-25 VITALS
RESPIRATION RATE: 18 BRPM | DIASTOLIC BLOOD PRESSURE: 78 MMHG | WEIGHT: 180 LBS | OXYGEN SATURATION: 96 % | SYSTOLIC BLOOD PRESSURE: 126 MMHG | HEIGHT: 69 IN | HEART RATE: 69 BPM | BODY MASS INDEX: 26.66 KG/M2 | TEMPERATURE: 97.6 F

## 2023-01-25 DIAGNOSIS — Z12.11 COLON CANCER SCREENING: ICD-10-CM

## 2023-01-25 PROBLEM — D12.5 ADENOMATOUS POLYP OF SIGMOID COLON: Status: ACTIVE | Noted: 2023-01-25

## 2023-01-25 PROCEDURE — 88305 TISSUE EXAM BY PATHOLOGIST: CPT

## 2023-01-25 PROCEDURE — 2580000003 HC RX 258: Performed by: INTERNAL MEDICINE

## 2023-01-25 PROCEDURE — 2500000003 HC RX 250 WO HCPCS: Performed by: NURSE ANESTHETIST, CERTIFIED REGISTERED

## 2023-01-25 PROCEDURE — 6370000000 HC RX 637 (ALT 250 FOR IP): Performed by: INTERNAL MEDICINE

## 2023-01-25 PROCEDURE — 2580000003 HC RX 258

## 2023-01-25 PROCEDURE — 3700000000 HC ANESTHESIA ATTENDED CARE: Performed by: INTERNAL MEDICINE

## 2023-01-25 PROCEDURE — 7100000011 HC PHASE II RECOVERY - ADDTL 15 MIN: Performed by: INTERNAL MEDICINE

## 2023-01-25 PROCEDURE — 7100000010 HC PHASE II RECOVERY - FIRST 15 MIN: Performed by: INTERNAL MEDICINE

## 2023-01-25 PROCEDURE — 45380 COLONOSCOPY AND BIOPSY: CPT | Performed by: INTERNAL MEDICINE

## 2023-01-25 PROCEDURE — 3609027000 HC COLONOSCOPY: Performed by: INTERNAL MEDICINE

## 2023-01-25 PROCEDURE — 3700000001 HC ADD 15 MINUTES (ANESTHESIA): Performed by: INTERNAL MEDICINE

## 2023-01-25 PROCEDURE — 2709999900 HC NON-CHARGEABLE SUPPLY: Performed by: INTERNAL MEDICINE

## 2023-01-25 PROCEDURE — 6360000002 HC RX W HCPCS: Performed by: NURSE ANESTHETIST, CERTIFIED REGISTERED

## 2023-01-25 RX ORDER — SIMETHICONE 20 MG/.3ML
EMULSION ORAL PRN
Status: DISCONTINUED | OUTPATIENT
Start: 2023-01-25 | End: 2023-01-25 | Stop reason: ALTCHOICE

## 2023-01-25 RX ORDER — PROPOFOL 10 MG/ML
INJECTION, EMULSION INTRAVENOUS PRN
Status: DISCONTINUED | OUTPATIENT
Start: 2023-01-25 | End: 2023-01-25 | Stop reason: SDUPTHER

## 2023-01-25 RX ORDER — SODIUM CHLORIDE 9 MG/ML
INJECTION, SOLUTION INTRAVENOUS
Status: COMPLETED
Start: 2023-01-25 | End: 2023-01-25

## 2023-01-25 RX ORDER — MAGNESIUM HYDROXIDE 1200 MG/15ML
LIQUID ORAL PRN
Status: DISCONTINUED | OUTPATIENT
Start: 2023-01-25 | End: 2023-01-25 | Stop reason: ALTCHOICE

## 2023-01-25 RX ORDER — LIDOCAINE HYDROCHLORIDE 20 MG/ML
INJECTION, SOLUTION INFILTRATION; PERINEURAL PRN
Status: DISCONTINUED | OUTPATIENT
Start: 2023-01-25 | End: 2023-01-25 | Stop reason: SDUPTHER

## 2023-01-25 RX ORDER — SODIUM CHLORIDE 9 MG/ML
INJECTION, SOLUTION INTRAVENOUS CONTINUOUS
Status: DISCONTINUED | OUTPATIENT
Start: 2023-01-25 | End: 2023-01-25 | Stop reason: HOSPADM

## 2023-01-25 RX ORDER — SODIUM CHLORIDE 9 MG/ML
INJECTION, SOLUTION INTRAVENOUS
Status: DISCONTINUED
Start: 2023-01-25 | End: 2023-01-25 | Stop reason: HOSPADM

## 2023-01-25 RX ADMIN — PROPOFOL 50 MG: 10 INJECTION, EMULSION INTRAVENOUS at 13:44

## 2023-01-25 RX ADMIN — PROPOFOL 50 MG: 10 INJECTION, EMULSION INTRAVENOUS at 13:50

## 2023-01-25 RX ADMIN — SODIUM CHLORIDE 500 ML: 9 INJECTION, SOLUTION INTRAVENOUS at 13:31

## 2023-01-25 RX ADMIN — PROPOFOL 50 MG: 10 INJECTION, EMULSION INTRAVENOUS at 13:53

## 2023-01-25 RX ADMIN — PROPOFOL 50 MG: 10 INJECTION, EMULSION INTRAVENOUS at 13:46

## 2023-01-25 RX ADMIN — PROPOFOL 50 MG: 10 INJECTION, EMULSION INTRAVENOUS at 13:42

## 2023-01-25 RX ADMIN — PROPOFOL 100 MG: 10 INJECTION, EMULSION INTRAVENOUS at 13:40

## 2023-01-25 RX ADMIN — PROPOFOL 50 MG: 10 INJECTION, EMULSION INTRAVENOUS at 13:48

## 2023-01-25 RX ADMIN — LIDOCAINE HYDROCHLORIDE 30 MG: 20 INJECTION, SOLUTION INFILTRATION; PERINEURAL at 13:49

## 2023-01-25 ASSESSMENT — PAIN - FUNCTIONAL ASSESSMENT: PAIN_FUNCTIONAL_ASSESSMENT: NONE - DENIES PAIN

## 2023-01-25 NOTE — ANESTHESIA PRE PROCEDURE
Department of Anesthesiology  Preprocedure Note       Name:  Kassy Ashley   Age:  50 y.o.  :  1970                                          MRN:  [id number]         Date:  2023      Surgeon: Osiel Vogel):  Paul Watts MD    Procedure: Procedure(s):  COLORECTAL CANCER SCREENING, NOT HIGH RISK    Medications prior to admission:   Prior to Admission medications    Medication Sig Start Date End Date Taking? Authorizing Provider   Respiratory Therapy Supplies LUIS ANTONIO CPAP Supplies - needs mask, tubing, and filters. 17   David Hollins DO       Current medications:    No current facility-administered medications for this encounter. 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    Arthritis of hand, degenerative M19.049    Prediabetes R73.03    Adenomatous polyp of sigmoid colon D12.5       Past Medical History:        Diagnosis Date    Arthritis     Knee pain, left 2015    Sleep apnea        Past Surgical History:        Procedure Laterality Date    ARTHROSCOPY / ARTHROTOMY KNEE Left 2016    LEFT KNEE ARTHROSCOPY / MEDIAL MENISECTOMY performed by Lea Butler MD at [address] [facility] ARTHROSCOPY      OTHER SURGICAL HISTORY  14    DR WHEELER   LT THUMB MP JOINT ARTHRODES'S    ROTATOR CUFF REPAIR      ROTATOR CUFF REPAIR Right     WRIST SURGERY Left        Social History:    Social History     Tobacco Use    Smoking status: Never    Smokeless tobacco: Never   Substance Use Topics    Alcohol use: Yes     Comment: 4-6                                Counseling given: Not Answered      Vital Signs (Current): There were no vitals filed for this visit.                                            BP Readings from Last 3 Encounters:   22 (!) 100/58   21 110/80   21 110/78       NPO Status: BMI:   Wt Readings from Last 3 Encounters:   12/01/22 197 lb (89.4 kg)   05/18/21 188 lb (85.3 kg)   05/07/21 184 lb 9.6 oz (83.7 kg)     There is no height or weight on file to calculate BMI.    CBC:   Lab Results   Component Value Date/Time    WBC 6.1 01/10/2018 08:14 AM    RBC 6.32 01/10/2018 08:14 AM    HGB 14.0 01/10/2018 08:14 AM    HCT 44.7 01/10/2018 08:14 AM    MCV 70.7 01/10/2018 08:14 AM    RDW 15.3 01/10/2018 08:14 AM     01/10/2018 08:14 AM       CMP:   Lab Results   Component Value Date/Time     12/01/2022 06:54 PM    K 4.1 12/01/2022 06:54 PM    CL 98 12/01/2022 06:54 PM    CO2 25 12/01/2022 06:54 PM    BUN 16 12/01/2022 06:54 PM    CREATININE 0.85 12/01/2022 06:54 PM    GFRAA >60.0 01/10/2018 08:14 AM    LABGLOM >60.0 12/01/2022 06:54 PM    GLUCOSE 139 12/01/2022 06:54 PM    PROT 7.0 12/01/2022 06:54 PM    CALCIUM 9.2 12/01/2022 06:54 PM    BILITOT 0.3 12/01/2022 06:54 PM    ALKPHOS 58 12/01/2022 06:54 PM    AST 30 12/01/2022 06:54 PM    ALT 42 12/01/2022 06:54 PM       POC Tests: No results for input(s): POCGLU, POCNA, POCK, POCCL, POCBUN, POCHEMO, POCHCT in the last 72 hours.     Coags:   Lab Results   Component Value Date/Time    PROTIME 10.6 11/04/2016 08:14 AM    INR 1.0 11/04/2016 08:14 AM    APTT 26.1 11/04/2016 08:14 AM       HCG (If Applicable): No results found for: PREGTESTUR, PREGSERUM, HCG, HCGQUANT     ABGs: No results found for: PHART, PO2ART, UEA1MCY, SKT2LVM, BEART, J9XLGUKB     Type & Screen (If Applicable):  No results found for: LABABO, LABRH    Drug/Infectious Status (If Applicable):  Lab Results   Component Value Date/Time    HEPCAB NONREACTIVE 12/01/2022 06:54 PM       COVID-19 Screening (If Applicable): No results found for: COVID19        Anesthesia Evaluation  Patient summary reviewed and Nursing notes reviewed  Airway: Mallampati: II  TM distance: >3 FB   Neck ROM: full  Mouth opening: > = 3 FB   Dental: Pulmonary:normal exam    (+) sleep apnea:                             Cardiovascular:Negative CV ROS                      Neuro/Psych:   Negative Neuro/Psych ROS              GI/Hepatic/Renal: Neg GI/Hepatic/Renal ROS            Endo/Other: Negative Endo/Other ROS                    Abdominal:             Vascular: negative vascular ROS. Other Findings:           Anesthesia Plan      MAC     ASA 2       Induction: intravenous. Anesthetic plan and risks discussed with patient.       Plan discussed with surgical team.                    Debbi Vanegas, SEFERINO - CRNA   1/25/2023

## 2023-01-25 NOTE — H&P
Patient Name: Melyssa Morris  : 1970  MRN: [id number]  DATE: 23      ENDOSCOPY  History and Physical    Procedure:    [] Diagnostic Colonoscopy       [x] Screening Colonoscopy  [] EGD      [] ERCP      [] EUS       [] Other    [x] Previous office notes/History and Physical reviewed from the patients chart. Please see EMR for further details of HPI. I have examined the patient's status immediately prior to the procedure and:      Indications/HPI:    []Abdominal Pain   []Cancer- GI/Lung  []Fhx of colon CA  []History of Polyps   []Rousseaus   []Melena  []Abnormal Imaging   []Dysphagia    []Persistent Pneumonia  []Anemia   []Food Impaction  []History of Polyps  []GI Bleed   []Pulmonary nodule/Mass  []Change in bowel habits  []Heartburn/Reflux  []Rectal Bleed (BRBPR)  []Chest Pain - Non Cardiac  []Heme (+) Stool  []Ulcers  []Constipation   []Hemoptysis   []Varices  []Diarrhea   []Hypoxemia  []Nausea/Vomiting   [x]Screening   []Crohns/Colitis  []Other:    Anesthesia:   [x] MAC [] Moderate Sedation   [] General   [] None     ROS: 12 pt Review of Symptoms was negative unless mentioned above    Medications:   Prior to Admission medications    Medication Sig Start Date End Date Taking? Authorizing Provider   Respiratory Therapy Supplies LUIS ANTONIO CPAP Supplies - needs mask, tubing, and filters.  17   French Lizarraga DO     Allergies: No Known Allergies   History of allergic reaction to anesthesia:  No  Past Medical History:  Past Medical History:   Diagnosis Date    Arthritis     Knee pain, left 2015    Sleep apnea      Past Surgical History:  Past Surgical History:   Procedure Laterality Date    ARTHROSCOPY / ARTHROTOMY KNEE Left 2016    LEFT KNEE ARTHROSCOPY / MEDIAL MENISECTOMY performed by Makenna Allan MD at [place] [address] ARTHROSCOPY      OTHER SURGICAL HISTORY  14    DR WHEELER   LT THUMB MP JOINT ARTHRODES'S    ROTATOR CUFF REPAIR      ROTATOR CUFF REPAIR Right     WRIST SURGERY Left      Social History:  Social History     Tobacco Use    Smoking status: Never    Smokeless tobacco: Never   Vaping Use    Vaping Use: Never used   Substance Use Topics    Alcohol use: Yes     Comment: 4-6    Drug use: No     Vital Signs:   Vitals:    01/25/23 1404   BP: 106/72   Pulse: 74   Resp: 18   Temp:    SpO2: 95%       Physical Exam:  Cardiac:  [x]WNL []Comments:  Pulmonary:  [x]WNL []Comments:   Neuro/Mental Status:  [x]WNL []Comments:  Abdominal:  [x]WNL []Comments:  Other:   []WNL []Comments:    Informed Consent:  The risks and benefits of the procedure have been discussed with either the patient or if they cannot consent, their representative. Assessment:  Patient examined and appropriate for planned sedation and procedure. Plan:  Proceed with planned sedation and procedure as above. The patient was counseled at length about risks of kirstie COVID-19 in the perioperative and any recovery window from the procedure. The patient was made aware that kirstie COVID-19 may worsen their prognosis for recovery from their procedure and lend to a higher morbidity and-all mortality risk. The patient was given the option of postponing the procedure all material risks, benefits, and alternatives were discussed. The patient does wish to proceed with the procedure at this time.     Robina Chaves MD  2:10 PM

## 2023-01-25 NOTE — ANESTHESIA POSTPROCEDURE EVALUATION
Department of Anesthesiology  Postprocedure Note    Patient: Imtiaz Patino  MRN: [id number]  [place]: 1970  Date of evaluation: 1/25/2023      Procedure Summary     Date: 01/25/23 Room / Location: [facility] OR [address] [place] [address]    Anesthesia Start: 1338 Anesthesia Stop: 1359    Procedure: COLORECTAL CANCER SCREENING, NOT HIGH RISK Diagnosis:       Colon cancer screening      (Colon cancer screening [Z12.11])    Surgeons: Ema Mccarthy MD Responsible Provider: SEFERINO Huitron CRNA    Anesthesia Type: MAC ASA Status: 2          Anesthesia Type: No value filed.     Donnell Phase I: Donnell Score: 10    Donnell Phase II:        Anesthesia Post Evaluation    Patient location during evaluation: bedside  Patient participation: complete - patient participated  Level of consciousness: agitated  Airway patency: patent  Nausea & Vomiting: no nausea and no vomiting  Complications: no  Cardiovascular status: blood pressure returned to baseline  Respiratory status: acceptable  Hydration status: euvolemic

## 2023-02-13 ENCOUNTER — HOSPITAL ENCOUNTER (OUTPATIENT)
Age: 53
Discharge: HOME | End: 2023-02-15
Payer: COMMERCIAL

## 2023-02-13 ENCOUNTER — HOSPITAL ENCOUNTER (OUTPATIENT)
Dept: GENERAL RADIOLOGY | Age: 53
Discharge: HOME | End: 2023-02-15
Payer: COMMERCIAL

## 2023-02-13 ENCOUNTER — OFFICE VISIT (OUTPATIENT)
Dept: FAMILY MEDICINE CLINIC | Age: 53
End: 2023-02-13
Payer: COMMERCIAL

## 2023-02-13 DIAGNOSIS — S99.921A INJURY OF RIGHT FOOT, INITIAL ENCOUNTER: ICD-10-CM

## 2023-02-13 DIAGNOSIS — M79.671 ACUTE PAIN OF RIGHT FOOT: ICD-10-CM

## 2023-02-13 DIAGNOSIS — M25.571 ACUTE RIGHT ANKLE PAIN: ICD-10-CM

## 2023-02-13 DIAGNOSIS — S99.911A INJURY OF RIGHT ANKLE, INITIAL ENCOUNTER: Primary | ICD-10-CM

## 2023-02-13 PROCEDURE — 73610 X-RAY EXAM OF ANKLE: CPT

## 2023-02-13 PROCEDURE — 99213 OFFICE O/P EST LOW 20 MIN: CPT | Performed by: NURSE PRACTITIONER

## 2023-02-13 PROCEDURE — 73630 X-RAY EXAM OF FOOT: CPT

## 2023-02-13 RX ORDER — IBUPROFEN 800 MG/1
800 TABLET ORAL EVERY 8 HOURS PRN
Qty: 90 TABLET | Refills: 0 | Status: SHIPPED | OUTPATIENT
Start: 2023-02-13

## 2023-02-13 RX ORDER — FAMOTIDINE 20 MG/1
20 TABLET, FILM COATED ORAL 2 TIMES DAILY
Qty: 30 TABLET | Refills: 0 | Status: SHIPPED | OUTPATIENT
Start: 2023-02-13

## 2023-02-13 NOTE — PROGRESS NOTES
Subjective  Gianna Owusu, 50 y.o. male presents today with:  Chief Complaint   Patient presents with    Other     Injury        HPI  Presents to [facility] for an injury   Affected area: right ankle   Discomfort radiates into right foot. States chronic heel pain to this foot. Injury occurred today. States went to grab car door & twisted his ankle   [place]/felt a pop to his right ankle   Swelling present to lateral side of ankle   No erythema or bruising of ankle/foot   He has been able to walk since the injury   Has not iced/elevated   No OTC meds for the discomfort thus far   Has had GI discomfort with gel caps ibuprofen/motrin prior. Tolerates tablets. He has crutches at home                   Past Medical History:   Diagnosis Date    Arthritis     Knee pain, left 02/16/2015    Sleep apnea       Past Surgical History:   Procedure Laterality Date    ARTHROSCOPY / ARTHROTOMY KNEE Left 11/11/2016    LEFT KNEE ARTHROSCOPY / MEDIAL MENISECTOMY performed by Moses Carl MD at [address] [place] [address] N/A 1/25/2023    COLORECTAL CANCER SCREENING, NOT HIGH RISK performed by Ruth Cr MD at [place] [address] ARTHROSCOPY      OTHER SURGICAL HISTORY  12/08/14    DR WHEELER   LT THUMB MP JOINT ARTHRODES'S    ROTATOR CUFF REPAIR      ROTATOR CUFF REPAIR Right     WRIST SURGERY Left      Family History   Problem Relation Age of Onset    Lupus Mother     Mental Illness Mother     High Blood Pressure Father     No Known Problems Sister     Asthma Son     Colon Cancer Neg Hx            Review of Systems   Constitutional:  Positive for activity change. Gastrointestinal:  Negative for nausea. Musculoskeletal:  Positive for arthralgias and gait problem. Skin:  Negative for color change. Neurological:  Negative for dizziness, weakness, light-headedness, numbness and headaches. PMH, Surgical Hx, Family Hx, and Social Hx reviewed and updated.             Objective  There were no vitals filed for this visit.  BP Readings from Last 3 Encounters:   01/25/23 126/78   12/01/22 (!) 100/58   05/18/21 110/80     Wt Readings from Last 3 Encounters:   01/25/23 180 lb (81.6 kg)   12/01/22 197 lb (89.4 kg)   05/18/21 188 lb (85.3 kg)           Physical Exam  Vitals reviewed. Constitutional:       General: He is not in acute distress. Appearance: Normal appearance. He is not toxic-appearing. HENT:      Right Ear: External ear normal.      Left Ear: External ear normal.      Nose: Nose normal.      Mouth/Throat:      Lips: Pink. Eyes:      General: Lids are normal.      Conjunctiva/sclera: Conjunctivae normal.   Cardiovascular:      Rate and Rhythm: Normal rate. Pulses:           Dorsalis pedis pulses are 2+ on the right side. Posterior tibial pulses are 2+ on the right side. Pulmonary:      Effort: Pulmonary effort is normal.   Musculoskeletal:         General: Normal range of motion. Cervical back: Normal range of motion. Right ankle: Swelling present. Tenderness present. Normal range of motion. Normal pulse. Right Achilles Tendon: No tenderness. Right foot: Normal range of motion. No foot drop. Legs:    Feet:      Right foot:      Skin integrity: Skin integrity normal.   Skin:     General: Skin is warm and dry. Capillary Refill: Capillary refill takes less than 2 seconds. Findings: Signs of injury present. No bruising, erythema or wound. Neurological:      General: No focal deficit present. Mental Status: He is alert and oriented to person, place, and time. Assessment & Plan    Diagnosis Orders   1. Injury of right ankle, initial encounter        2.  Injury of right foot, initial encounter  XR FOOT RIGHT (MIN 3 VIEWS)      3. Acute right ankle pain  XR ANKLE RIGHT (MIN 3 VIEWS)      4. Acute pain of right foot          Orders Placed This Encounter   Procedures    XR FOOT RIGHT (MIN 3 VIEWS)     Standing Status:   Future     Number of Occurrences:   1     Standing Expiration Date:   2/13/2024     Order Specific Question:   Reason for exam:     Answer:   injury    XR ANKLE RIGHT (MIN 3 VIEWS)     Standing Status:   Future     Number of Occurrences:   1     Standing Expiration Date:   2/13/2024     Order Specific Question:   Reason for exam:     Answer:   injury     Orders Placed This Encounter   Medications    ibuprofen (ADVIL;MOTRIN) 800 MG tablet     Sig: Take 1 tablet by mouth every 8 hours as needed for Pain     Dispense:  90 tablet     Refill:  0    famotidine (PEPCID) 20 MG tablet     Sig: Take 1 tablet by mouth 2 times daily     Dispense:  30 tablet     Refill:  0         Return in about 3 weeks (around 3/6/2023) if symptoms worsen or fail to improve, for follow up with PCP. Reviewed with the patient: current clinical status & medications. Side effects, adverse effects of the medication prescribed today, as well as treatment plan/rationale and result expectations have been discussed with the patient who expressed understanding. How can you care for yourself at home? Prop up your foot on pillows as much as possible for the next 3 days. Try to keep your ankle above the level of your heart. This will help reduce the swelling. Follow your doctor's directions for wearing a splint or elastic bandage. Wrapping the ankle may help reduce or prevent swelling. Your doctor may give you a splint, a brace, an air stirrup, or another form of ankle support to protect your ankle until it is healed. Wear it as directed while your ankle is healing. Do not remove it unless your doctor tells you to. After your ankle has healed, ask your doctor whether you should wear the brace when you exercise. Put ice or cold packs on your injured ankle for 10 to 20 minutes at a time. Try to do this every 1 to 2 hours for the next 3 days (when you are awake) or until the swelling goes down. Put a thin cloth between the ice and your skin.   You may need to use crutches until you can walk without pain. If you do use crutches, try to bear some weight on your injured ankle if you can do so without pain. This helps the ankle heal.  Take pain medicines exactly as directed. If the doctor gave you a prescription medicine for pain, take it as prescribed. If you are not taking a prescription pain medicine, ask your doctor if you can take an over-the-counter medicine. If you have been given ankle exercises to do at home, do them exactly as instructed. These can promote healing and help prevent lasting weakness. Close follow up to evaluate treatment results and for coordination of care. I have reviewed the patient's medical history in detail and updated the computerized patient record.       SEFERINO Dillard - ORTEGA

## 2023-02-17 PROBLEM — S46.019A TRAUMATIC ROTATOR CUFF TEAR: Status: ACTIVE | Noted: 2023-02-17

## 2023-02-17 ASSESSMENT — ENCOUNTER SYMPTOMS
COLOR CHANGE: 0
NAUSEA: 0

## 2023-04-17 ENCOUNTER — APPOINTMENT (OUTPATIENT)
Dept: PRIMARY CARE | Facility: CLINIC | Age: 53
End: 2023-04-17
Payer: COMMERCIAL

## 2023-04-17 ENCOUNTER — OFFICE VISIT (OUTPATIENT)
Dept: PRIMARY CARE | Facility: CLINIC | Age: 53
End: 2023-04-17
Payer: COMMERCIAL

## 2023-04-17 VITALS
DIASTOLIC BLOOD PRESSURE: 86 MMHG | BODY MASS INDEX: 27.49 KG/M2 | HEART RATE: 71 BPM | TEMPERATURE: 97.4 F | WEIGHT: 185.6 LBS | RESPIRATION RATE: 12 BRPM | OXYGEN SATURATION: 98 % | SYSTOLIC BLOOD PRESSURE: 114 MMHG | HEIGHT: 69 IN

## 2023-04-17 DIAGNOSIS — Z12.5 SPECIAL SCREENING FOR MALIGNANT NEOPLASM OF PROSTATE: ICD-10-CM

## 2023-04-17 DIAGNOSIS — B07.9 VIRAL WARTS, UNSPECIFIED TYPE: ICD-10-CM

## 2023-04-17 DIAGNOSIS — R10.31 RIGHT INGUINAL PAIN: ICD-10-CM

## 2023-04-17 DIAGNOSIS — R73.03 PRE-DIABETES: Primary | ICD-10-CM

## 2023-04-17 PROCEDURE — 1036F TOBACCO NON-USER: CPT | Performed by: FAMILY MEDICINE

## 2023-04-17 PROCEDURE — 99203 OFFICE O/P NEW LOW 30 MIN: CPT | Performed by: FAMILY MEDICINE

## 2023-04-17 ASSESSMENT — PATIENT HEALTH QUESTIONNAIRE - PHQ9
1. LITTLE INTEREST OR PLEASURE IN DOING THINGS: NOT AT ALL
2. FEELING DOWN, DEPRESSED OR HOPELESS: NOT AT ALL
SUM OF ALL RESPONSES TO PHQ9 QUESTIONS 1 AND 2: 0

## 2023-04-17 NOTE — PROGRESS NOTES
"Subjective   Patient ID: Hari Pierre is a 52 y.o. male who presents for Establish Care, Pre-Diabetes, and Verrucous Vulgaris.    HPI  Patient presents today to Establish New Care. He was seeing us but then switched to Dr. Chávez with [facility] due to insurance. We now take his insurance, so he's coming back.     He saw Dr. Chávez in December 2022, and his A1C was 6.1. He has been eating better and would like another A1C done to see if it's gone down.    Reports groin pain x \"months\". No injury or swelling. Has not noticed any lumps.     Has a wart on his left thumb he would like frozen off. It is painful at times.     Taking current medications which were reviewed.  Problem list discussed.    Overall doing well.  Eating okay.  Staying active.    Has no other new problem(s)/question(s).    ROS  Constitutional- No activity change. No appetite change.  Eyes- Denies vision changes.  Respiratory- No shortness of breath.  Cardiovascular- No palpitations. No chest pain.  GI- No nausea or vomiting. No diarrhea or constipation. Denies abdominal pain.   : Positive groin pain   Musculoskeletal- Denies joint swelling.  Extremities- No edema.  Neurological- Denies headaches. Denies dizziness.  Skin- Positive verrucous vulgaris to L thumb. No rashes.  Psychiatric/Behavioral- Denies significant anxiety, or depressed mood.     Objective     /86   Pulse 71   Temp 36.3 °C (97.4 °F)   Resp 12   Ht 1.753 m (5' 9\")   Wt 84.2 kg (185 lb 9.6 oz)   SpO2 98%   BMI 27.41 kg/m²     No Known Allergies     Physical exam   Constitutional-- Well-nourished. No distress  Head- Unremarkable.  Ears- TMs clear.  Eyes- PERRL. Conjunctiva normal.  Nose- Normal. No rhinorrhea noted.  Throat- Oropharynx is clear and moist.  Neck- Supple with no thyromegaly. No significant cervical adenopathy noted.  Pulmonary/Chest- Breath sounds normal with normal effort.  No wheezing.  Heart- Regular rate and rhythm. No murmur.  Abdomen- Soft and " non-tender. No masses noted.  Musculoskeletal- Normal ROM. No significant joint swelling  Extremities- No edema.   Neurological- Alert. No noted deficits.  Skin- Warm.  Small 3 mm wart on his left thumb was treated with liquid nitrogen in the freeze thaw method.  Tolerated well  Psychiatric/Behavioral- Mood and affect normal. Behavior normal.     Patient ID: Hari Pierre is a 52 y.o. male.    Destruction of lesion    Date/Time: 4/17/2023 6:38 PM    Performed by: Marlo Benjamin MD  Authorized by: Marlo Benjamin MD    Number of Lesions: 1  Lesion 1:     Body area: upper extremity    Upper extremity location: L thumb    Malignancy: benign lesion      Destruction method: cryotherapy      Destruction method comment: coal burning    Assessment/Plan   1. Pre-diabetes  Hemoglobin A1c    Basic Metabolic Panel      2. Special screening for malignant neoplasm of prostate  PSA      3. Viral warts, unspecified type        4. Right inguinal pain           Long talk. Treatment options reviewed.     Pre-diabetes: Repeat Hg A1c ordered (fasting).     Special screening for malignant neoplasm of prostate: PSA ordered (non-fasting).     R inguinal pain likely from strain/tendinitis: Continue to monitor; okay to take OTC Advil or Motrin twice daily for pain as needed. Call the office for a steroid pack if your Sx do not improve or worsen after the weekend.     Verrucous vulgaris to L thumb: Area removed today IO with burned coal. May apply cold compresses as needed for pain. Okay to use OTC Neosporin as needed for blistering.     Continue and take your medications as prescribed.    Health Maintenance issues discussed.    Importance of healthy diet and regular exercise regimen discussed.    We will contact you with any test results ordered. If you do not hear from us, please contact.    Follow-up as instructed or sooner if any problems or symptoms do not resolve as expected.     Scribe Attestation  By signing my name below, JOHNNY, Eb  Rahel Cole   attest that this documentation has been prepared under the direction and in the presence of Marlo Benjamin MD.

## 2023-04-18 ENCOUNTER — LAB (OUTPATIENT)
Dept: LAB | Facility: LAB | Age: 53
End: 2023-04-18
Payer: COMMERCIAL

## 2023-04-18 DIAGNOSIS — R73.03 PRE-DIABETES: ICD-10-CM

## 2023-04-18 DIAGNOSIS — Z12.5 SPECIAL SCREENING FOR MALIGNANT NEOPLASM OF PROSTATE: ICD-10-CM

## 2023-04-18 LAB
ANION GAP IN SER/PLAS: 12 MMOL/L (ref 10–20)
CALCIUM (MG/DL) IN SER/PLAS: 9.5 MG/DL (ref 8.6–10.3)
CARBON DIOXIDE, TOTAL (MMOL/L) IN SER/PLAS: 27 MMOL/L (ref 21–32)
CHLORIDE (MMOL/L) IN SER/PLAS: 104 MMOL/L (ref 98–107)
CREATININE (MG/DL) IN SER/PLAS: 0.77 MG/DL (ref 0.5–1.3)
ESTIMATED AVERAGE GLUCOSE FOR HBA1C: 126 MG/DL
GFR MALE: >90 ML/MIN/1.73M2
GLUCOSE (MG/DL) IN SER/PLAS: 102 MG/DL (ref 74–99)
HEMOGLOBIN A1C/HEMOGLOBIN TOTAL IN BLOOD: 6 %
POTASSIUM (MMOL/L) IN SER/PLAS: 4.3 MMOL/L (ref 3.5–5.3)
PROSTATE SPECIFIC AG (NG/ML) IN SER/PLAS: 1.49 NG/ML (ref 0–4)
SODIUM (MMOL/L) IN SER/PLAS: 139 MMOL/L (ref 136–145)
UREA NITROGEN (MG/DL) IN SER/PLAS: 23 MG/DL (ref 6–23)

## 2023-04-18 PROCEDURE — 36415 COLL VENOUS BLD VENIPUNCTURE: CPT

## 2023-04-18 PROCEDURE — 83036 HEMOGLOBIN GLYCOSYLATED A1C: CPT

## 2023-04-18 PROCEDURE — 80048 BASIC METABOLIC PNL TOTAL CA: CPT

## 2023-04-18 PROCEDURE — 84153 ASSAY OF PSA TOTAL: CPT

## 2023-12-19 ENCOUNTER — OFFICE VISIT (OUTPATIENT)
Dept: PRIMARY CARE | Facility: CLINIC | Age: 53
End: 2023-12-19
Payer: COMMERCIAL

## 2023-12-19 VITALS
TEMPERATURE: 98 F | HEIGHT: 69 IN | BODY MASS INDEX: 29.33 KG/M2 | OXYGEN SATURATION: 98 % | WEIGHT: 198 LBS | SYSTOLIC BLOOD PRESSURE: 120 MMHG | HEART RATE: 70 BPM | RESPIRATION RATE: 18 BRPM | DIASTOLIC BLOOD PRESSURE: 80 MMHG

## 2023-12-19 DIAGNOSIS — M25.569 KNEE PAIN, UNSPECIFIED CHRONICITY, UNSPECIFIED LATERALITY: ICD-10-CM

## 2023-12-19 DIAGNOSIS — M54.9 BACK PAIN, UNSPECIFIED BACK LOCATION, UNSPECIFIED BACK PAIN LATERALITY, UNSPECIFIED CHRONICITY: Primary | ICD-10-CM

## 2023-12-19 DIAGNOSIS — R73.03 PRE-DIABETES: ICD-10-CM

## 2023-12-19 PROCEDURE — 1036F TOBACCO NON-USER: CPT | Performed by: FAMILY MEDICINE

## 2023-12-19 PROCEDURE — 99213 OFFICE O/P EST LOW 20 MIN: CPT | Performed by: FAMILY MEDICINE

## 2023-12-19 RX ORDER — METHYLPREDNISOLONE 4 MG/1
TABLET ORAL
Qty: 21 TABLET | Refills: 1 | Status: SHIPPED | OUTPATIENT
Start: 2023-12-19

## 2023-12-19 ASSESSMENT — PATIENT HEALTH QUESTIONNAIRE - PHQ9
2. FEELING DOWN, DEPRESSED OR HOPELESS: NOT AT ALL
1. LITTLE INTEREST OR PLEASURE IN DOING THINGS: NOT AT ALL
SUM OF ALL RESPONSES TO PHQ9 QUESTIONS 1 AND 2: 0

## 2023-12-19 NOTE — PROGRESS NOTES
"Subjective   Patient ID: Hari Pierre is a 53 y.o. male who presents for Knee Pain and Back Pain.  HPI    Patient presents in office today for back pain. Ongoing for a while. Admits that he has been to see a chiropractor for this. Was suggested to take prednisone. Admits that this has been severe for him. Pain is 10/10. OTC Advil. Admits that this is right between his shoulder blades. Admits that the pain can be 10/10 when he moves wrong.     Patient presents in office today for knee pain. Ongoing today. Patient admits to injury in the past. Pain is 2-3/10 constantly. OTC Advil. Patient admits that he has had some surgeries in the past, last one was 8-10 year ago.     Patient presents in office today for pressure in his head. Denies any issues with blood pressure. Ongoing 2-3 weeks. Denies family history of heart disease.     Taking current medications which were reviewed.  Problem list discussed.    Overall doing well.  Eating okay.  Staying active.    Has no other new problem /question.     ROS  Constitutional- No activity change. No appetite change.  Eyes- Denies vision changes.  Respiratory- No shortness of breath.  Cardiovascular- No palpitations. No chest pain.  GI- No nausea or vomiting. No diarrhea or constipation. Denies abdominal pain.  Musculoskeletal- Denies joint swelling.  Extremities- No edema.  Neurological- Denies headaches. Denies dizziness.  Skin- No rashes.  Psychiatric/Behavioral- Denies significant anxiety, or depressed mood.     Objective     /80   Pulse 70   Temp 36.7 °C (98 °F) (Temporal)   Resp 18   Ht 1.753 m (5' 9\")   Wt 89.8 kg (198 lb)   SpO2 98%   BMI 29.24 kg/m²     No Known Allergies    Constitutional-- Well-nourished.  No distress  Head- unremarkable.  Eyes- PERRL.  Conjunctiva normal.  Nose- Normal.  No rhinorrhea noted.  Throat- Oropharynx is clear and moist.  Neck- Supple with no thyromegaly.  No significant cervical adenopathy noted.  Pulmonary/Chest- Breath " sounds normal with normal effort.  No wheezing.  Heart- Regular rate and rhythm.  No murmur.  Abdomen- Soft and non-tender.  No masses noted.  Musculoskeletal-mild low back pain exacerbated with range of motion.  Mild knee pain with walking.  Ligaments intact.  Drawer sign negative  Extremities- No edema.   Neurological- Alert.  No noted deficits.  Skin- Warm.       Assessment/Plan   1. Back pain, unspecified back location, unspecified back pain laterality, unspecified chronicity  methylPREDNISolone (Medrol Dospak) 4 mg tablets      2. Knee pain, unspecified chronicity, unspecified laterality  methylPREDNISolone (Medrol Dospak) 4 mg tablets      3. Pre-diabetes               Long talk. Treatment options reviewed.    Discussed back pain.  Discussed knee pain.  Continue to monitor. Take methylprednisolone as discussed.  Rest and avoid activities that exacerbate symptoms.    Continue and take your medications as prescribed.    Importance of healthy diet and regular exercise regimen discussed.      Follow-up as instructed or sooner if any problems or symptoms do not resolve as expected.    Scribe Attestation  By signing my name below, ICandis Scribe   attest that this documentation has been prepared under the direction and in the presence of Marlo Benjamin MD.

## 2024-01-24 ENCOUNTER — APPOINTMENT (OUTPATIENT)
Dept: PRIMARY CARE | Facility: CLINIC | Age: 54
End: 2024-01-24
Payer: COMMERCIAL

## 2024-06-19 ENCOUNTER — APPOINTMENT (OUTPATIENT)
Dept: PRIMARY CARE | Facility: CLINIC | Age: 54
End: 2024-06-19
Payer: COMMERCIAL

## 2024-06-26 ENCOUNTER — APPOINTMENT (OUTPATIENT)
Dept: PRIMARY CARE | Facility: CLINIC | Age: 54
End: 2024-06-26
Payer: COMMERCIAL

## 2024-06-27 ENCOUNTER — APPOINTMENT (OUTPATIENT)
Dept: PRIMARY CARE | Facility: CLINIC | Age: 54
End: 2024-06-27
Payer: COMMERCIAL

## 2024-06-27 VITALS
HEART RATE: 75 BPM | BODY MASS INDEX: 27.96 KG/M2 | DIASTOLIC BLOOD PRESSURE: 80 MMHG | HEIGHT: 69 IN | RESPIRATION RATE: 18 BRPM | WEIGHT: 188.8 LBS | SYSTOLIC BLOOD PRESSURE: 120 MMHG

## 2024-06-27 DIAGNOSIS — Z00.00 ANNUAL PHYSICAL EXAM: Primary | ICD-10-CM

## 2024-06-27 DIAGNOSIS — R53.83 OTHER FATIGUE: ICD-10-CM

## 2024-06-27 DIAGNOSIS — N40.0 BENIGN PROSTATIC HYPERPLASIA, UNSPECIFIED WHETHER LOWER URINARY TRACT SYMPTOMS PRESENT: ICD-10-CM

## 2024-06-27 DIAGNOSIS — R73.03 PRE-DIABETES: ICD-10-CM

## 2024-06-27 PROCEDURE — 1036F TOBACCO NON-USER: CPT | Performed by: FAMILY MEDICINE

## 2024-06-27 PROCEDURE — 99396 PREV VISIT EST AGE 40-64: CPT | Performed by: FAMILY MEDICINE

## 2024-06-27 NOTE — PROGRESS NOTES
"Subjective   Patient ID: Hari Pierre is a 53 y.o. male who presents for Annual Exam.  HPI    Patient presents in office today for an Annual physical. Last eye exam 6 months ago, last dental exam February 2024. No new family h/o of cancer or heart disease. Does not need paperwork filled out today.      Taking current medications which were reviewed.  Problem list discussed.    Wart left index fing    Overall doing well.  Eating okay.  Staying active.    Has no other new problem /question.     ROS  Constitutional- No activity change. No appetite change.  Eyes- Denies vision changes.  Respiratory- No shortness of breath.  Cardiovascular- No palpitations. No chest pain.  GI- No nausea or vomiting. No diarrhea or constipation. Denies abdominal pain.  Musculoskeletal- Denies joint swelling.  Extremities- No edema.  Neurological- Denies headaches. Denies dizziness.  Skin- No rashes.  Psychiatric/Behavioral- Denies significant anxiety, or depressed mood.     Objective     /80   Pulse 75   Resp 18   Ht 1.753 m (5' 9\")   Wt 85.6 kg (188 lb 12.8 oz)   BMI 27.88 kg/m²     No Known Allergies    Constitutional-- Well-nourished.  No distress  Head- unremarkable.  Eyes- PERRL.  Conjunctiva normal.  Nose- Normal.  No rhinorrhea noted.  Throat- Oropharynx is clear and moist.  Neck- Supple with no thyromegaly.  No significant cervical adenopathy noted.  Pulmonary/Chest- Breath sounds normal with normal effort.  No wheezing.  Heart- Regular rate and rhythm.  No murmur.  Abdomen- Soft and non-tender.  No masses noted.  Musculoskeletal- Normal ROM.  No significant joint swelling  Extremities- No edema.   Neurological- Alert.  No noted deficits.  Skin- Warm.  No rashes.  Psychiatric/Behavioral- Mood and affect normal.  Behavior normal.     Assessment/Plan   1. Annual physical exam  CBC and Auto Differential    Comprehensive Metabolic Panel    Lipid Panel      2. Benign prostatic hyperplasia, unspecified whether lower " urinary tract symptoms present  Prostate Specific Antigen, Screen      3. Other fatigue  CBC and Auto Differential    Comprehensive Metabolic Panel      4. Pre-diabetes  Hemoglobin A1C             Long talk. Treatment options reviewed.    Health Maintenance issues discussed.    Importance of healthy diet and regular exercise regimen discussed.  Patient has improved his diet.  And is hoping his sugar will be better this year.    Labs as ordered  We will contact you with any test results ordered. If you do not hear from us, please contact.    Follow-up as instructed or sooner if any problems or symptoms do not resolve as expected.

## 2024-07-17 ENCOUNTER — LAB (OUTPATIENT)
Dept: LAB | Facility: LAB | Age: 54
End: 2024-07-17
Payer: COMMERCIAL

## 2024-07-17 DIAGNOSIS — R73.03 PRE-DIABETES: ICD-10-CM

## 2024-07-17 DIAGNOSIS — Z00.00 ANNUAL PHYSICAL EXAM: ICD-10-CM

## 2024-07-17 DIAGNOSIS — R53.83 OTHER FATIGUE: ICD-10-CM

## 2024-07-17 DIAGNOSIS — N40.0 BENIGN PROSTATIC HYPERPLASIA, UNSPECIFIED WHETHER LOWER URINARY TRACT SYMPTOMS PRESENT: ICD-10-CM

## 2024-07-17 LAB
ALBUMIN SERPL BCP-MCNC: 4.4 G/DL (ref 3.4–5)
ALP SERPL-CCNC: 51 U/L (ref 33–120)
ALT SERPL W P-5'-P-CCNC: 22 U/L (ref 10–52)
ANION GAP SERPL CALC-SCNC: 13 MMOL/L (ref 10–20)
AST SERPL W P-5'-P-CCNC: 17 U/L (ref 9–39)
BASOPHILS # BLD AUTO: 0.04 X10*3/UL (ref 0–0.1)
BASOPHILS NFR BLD AUTO: 0.7 %
BILIRUB SERPL-MCNC: 0.5 MG/DL (ref 0–1.2)
BUN SERPL-MCNC: 20 MG/DL (ref 6–23)
CALCIUM SERPL-MCNC: 8.9 MG/DL (ref 8.6–10.3)
CHLORIDE SERPL-SCNC: 106 MMOL/L (ref 98–107)
CHOLEST SERPL-MCNC: 200 MG/DL (ref 0–199)
CHOLESTEROL/HDL RATIO: 2.7
CO2 SERPL-SCNC: 25 MMOL/L (ref 21–32)
CREAT SERPL-MCNC: 0.83 MG/DL (ref 0.5–1.3)
EGFRCR SERPLBLD CKD-EPI 2021: >90 ML/MIN/1.73M*2
EOSINOPHIL # BLD AUTO: 0.26 X10*3/UL (ref 0–0.7)
EOSINOPHIL NFR BLD AUTO: 4.5 %
ERYTHROCYTE [DISTWIDTH] IN BLOOD BY AUTOMATED COUNT: 17 % (ref 11.5–14.5)
EST. AVERAGE GLUCOSE BLD GHB EST-MCNC: 126 MG/DL
GLUCOSE SERPL-MCNC: 101 MG/DL (ref 74–99)
HBA1C MFR BLD: 6 %
HCT VFR BLD AUTO: 44.5 % (ref 41–52)
HDLC SERPL-MCNC: 75 MG/DL
HGB BLD-MCNC: 13.7 G/DL (ref 13.5–17.5)
IMM GRANULOCYTES # BLD AUTO: 0.01 X10*3/UL (ref 0–0.7)
IMM GRANULOCYTES NFR BLD AUTO: 0.2 % (ref 0–0.9)
LDLC SERPL CALC-MCNC: 110 MG/DL
LYMPHOCYTES # BLD AUTO: 1.86 X10*3/UL (ref 1.2–4.8)
LYMPHOCYTES NFR BLD AUTO: 31.8 %
MCH RBC QN AUTO: 22.3 PG (ref 26–34)
MCHC RBC AUTO-ENTMCNC: 30.8 G/DL (ref 32–36)
MCV RBC AUTO: 73 FL (ref 80–100)
MONOCYTES # BLD AUTO: 0.6 X10*3/UL (ref 0.1–1)
MONOCYTES NFR BLD AUTO: 10.3 %
NEUTROPHILS # BLD AUTO: 3.07 X10*3/UL (ref 1.2–7.7)
NEUTROPHILS NFR BLD AUTO: 52.5 %
NON HDL CHOLESTEROL: 125 MG/DL (ref 0–149)
NRBC BLD-RTO: 0 /100 WBCS (ref 0–0)
PLATELET # BLD AUTO: 182 X10*3/UL (ref 150–450)
POTASSIUM SERPL-SCNC: 4.7 MMOL/L (ref 3.5–5.3)
PROT SERPL-MCNC: 6.8 G/DL (ref 6.4–8.2)
PSA SERPL-MCNC: 3.65 NG/ML
RBC # BLD AUTO: 6.14 X10*6/UL (ref 4.5–5.9)
SODIUM SERPL-SCNC: 139 MMOL/L (ref 136–145)
TRIGL SERPL-MCNC: 76 MG/DL (ref 0–149)
VLDL: 15 MG/DL (ref 0–40)
WBC # BLD AUTO: 5.8 X10*3/UL (ref 4.4–11.3)

## 2024-07-17 PROCEDURE — 84153 ASSAY OF PSA TOTAL: CPT

## 2024-07-17 PROCEDURE — 80053 COMPREHEN METABOLIC PANEL: CPT

## 2024-07-17 PROCEDURE — 36415 COLL VENOUS BLD VENIPUNCTURE: CPT

## 2024-07-17 PROCEDURE — 85025 COMPLETE CBC W/AUTO DIFF WBC: CPT

## 2024-07-17 PROCEDURE — 80061 LIPID PANEL: CPT

## 2024-07-17 PROCEDURE — 83036 HEMOGLOBIN GLYCOSYLATED A1C: CPT

## 2024-12-04 DIAGNOSIS — M25.569 KNEE PAIN, UNSPECIFIED CHRONICITY, UNSPECIFIED LATERALITY: ICD-10-CM

## 2024-12-04 DIAGNOSIS — M54.9 BACK PAIN, UNSPECIFIED BACK LOCATION, UNSPECIFIED BACK PAIN LATERALITY, UNSPECIFIED CHRONICITY: ICD-10-CM

## 2024-12-04 RX ORDER — METHYLPREDNISOLONE 4 MG/1
TABLET ORAL
Qty: 21 TABLET | Refills: 0 | Status: SHIPPED | OUTPATIENT
Start: 2024-12-04

## 2024-12-04 NOTE — TELEPHONE ENCOUNTER
Hello,  I have a prescription for Medrol that has a refill until 12/18/24.  Due to [facility] in [place] closing I am unable to fill it.  Would it be possible to have this sent the [facility] in [place] since the prescription is still active?      Thank you,  Hari Pierre

## 2025-04-22 ENCOUNTER — APPOINTMENT (OUTPATIENT)
Dept: PRIMARY CARE | Facility: CLINIC | Age: 55
End: 2025-04-22
Payer: COMMERCIAL

## 2025-04-22 VITALS
HEIGHT: 69 IN | SYSTOLIC BLOOD PRESSURE: 120 MMHG | RESPIRATION RATE: 18 BRPM | WEIGHT: 196 LBS | DIASTOLIC BLOOD PRESSURE: 70 MMHG | BODY MASS INDEX: 29.03 KG/M2

## 2025-04-22 DIAGNOSIS — B07.9 VIRAL WARTS, UNSPECIFIED TYPE: ICD-10-CM

## 2025-04-22 DIAGNOSIS — R73.03 PRE-DIABETES: ICD-10-CM

## 2025-04-22 DIAGNOSIS — Z12.5 SCREENING PSA (PROSTATE SPECIFIC ANTIGEN): ICD-10-CM

## 2025-04-22 DIAGNOSIS — R53.83 OTHER FATIGUE: ICD-10-CM

## 2025-04-22 DIAGNOSIS — Z00.00 ANNUAL PHYSICAL EXAM: Primary | ICD-10-CM

## 2025-04-22 DIAGNOSIS — E78.00 HYPERCHOLESTEROLEMIA: ICD-10-CM

## 2025-04-22 DIAGNOSIS — M25.569 KNEE PAIN, UNSPECIFIED CHRONICITY, UNSPECIFIED LATERALITY: ICD-10-CM

## 2025-04-22 DIAGNOSIS — Z23 NEED FOR SHINGLES VACCINE: ICD-10-CM

## 2025-04-22 PROCEDURE — 90750 HZV VACC RECOMBINANT IM: CPT | Performed by: FAMILY MEDICINE

## 2025-04-22 PROCEDURE — 3008F BODY MASS INDEX DOCD: CPT | Performed by: FAMILY MEDICINE

## 2025-04-22 PROCEDURE — 1036F TOBACCO NON-USER: CPT | Performed by: FAMILY MEDICINE

## 2025-04-22 PROCEDURE — 99396 PREV VISIT EST AGE 40-64: CPT | Performed by: FAMILY MEDICINE

## 2025-04-22 PROCEDURE — 90471 IMMUNIZATION ADMIN: CPT | Performed by: FAMILY MEDICINE

## 2025-04-22 RX ORDER — METHYLPREDNISOLONE 4 MG/1
TABLET ORAL
Qty: 21 TABLET | Refills: 0 | Status: SHIPPED | OUTPATIENT
Start: 2025-04-22

## 2025-04-22 NOTE — PROGRESS NOTES
"Subjective   Patient ID: Hari Pierre is a 54 y.o. male who presents for Annual Exam and Knee Pain.  HPI    Patient presents in office today for an Annual physical. Last eye exam 1 year ago, last dental exam 1 year ago. No new family h/o of cancer or heart disease. Does not need paperwork filled out today.      Patient presents in office today  for knee pain. Ongoing x 2-3 weeks. Located in the left knee. Pain is 3/10. Describes the pain as nagging and achy .  Patient has tried ibuprofen OTC for this. Patient has had surgery in the past.    Cole presents in office today for a wart. Located on the left thumb. Ongoing x 6 months. Would like to have this taken off. Also has one on his left index finger.     Would like to get the second shingles vaccine.     Taking current medications which were reviewed.  Problem list discussed.    Overall doing well.  Eating okay.  Staying active.    Has no other new problem /question.     ROS  Constitutional- No activity change. No appetite change.  Eyes- Denies vision changes.  Respiratory- No shortness of breath.  Cardiovascular- No palpitations. No chest pain.  GI- No nausea or vomiting. No diarrhea or constipation. Denies abdominal pain.  Musculoskeletal- myalgias  Extremities- No edema.  Neurological- Denies headaches. Denies dizziness.  Skin- No rashes.  Psychiatric/Behavioral- Denies significant anxiety, or depressed mood.     Objective     /70   Resp 18   Ht 1.753 m (5' 9\")   Wt 88.9 kg (196 lb)   BMI 28.94 kg/m²     Allergies[1]    Constitutional-- Well-nourished.  No distress  Head- unremarkable.  Eyes- PERRL.  Conjunctiva normal.  Nose- Normal.  No rhinorrhea noted.  Throat- Oropharynx is clear and moist.  Neck- Supple with no thyromegaly.  No significant cervical adenopathy noted.  Pulmonary/Chest- Breath sounds normal with normal effort.  No wheezing.  Heart- Regular rate and rhythm.  No murmur.  Abdomen- Soft and non-tender.  No masses " noted.  Musculoskeletal- Normal ROM.  No significant joint swelling  Extremities- No edema.   Neurological- Alert.  No noted deficits.  Skin- Warm.  Small wart left thumb treated liquid nitrogen in the freeze thaw freeze method.  Tolerated well  Psychiatric/Behavioral- Mood and affect normal.  Behavior normal.     Assessment/Plan   1. Annual physical exam        2. Pre-diabetes  Hemoglobin A1C    Hemoglobin A1C      3. Viral warts, unspecified type        4. Screening PSA (prostate specific antigen)  Prostate Specific Antigen, Screen    Prostate Specific Antigen, Screen      5. Other fatigue  CBC and Auto Differential    Comprehensive Metabolic Panel    CBC and Auto Differential    Comprehensive Metabolic Panel      6. Hypercholesterolemia  Lipid Panel    Lipid Panel      7. Knee pain, unspecified chronicity, unspecified laterality  methylPREDNISolone (Medrol Dospak) 4 mg tablets      8. Need for shingles vaccine  Zoster vaccine, recombinant, adult (SHINGRIX)             Long talk. Treatment options reviewed.    Reviewed most recent lab work with patient. Advised patient to remain up to date on routine maintenance and health screening.     Discussed A1C. Complete blood work as discussed. Advised patient to remain properly hydrated.     Okay for second shingles vaccine today.    Discussed importance of natural sources of nutrition.  Advised patient to consume vegetables, salads, fruits, nuts, and proteins such as fish and chicken.  Discussed portion control.      Discussed the importance of routine stretching and exercise.     Discussed knee pain. Take Methylprednisolone as discussed. Continue to monitor.     Continue and take your medications as prescribed.    Health Maintenance issues discussed.    Importance of healthy diet and regular exercise regimen discussed.    We will contact you with any test results ordered. If you do not hear from us, please contact.    Follow-up as instructed or sooner if any problems or  symptoms do not resolve as expected.    All medical record entries made by the Scribe were at my direction and personally dictated by me.    I have reviewed the chart and agree that the record accurately reflects my personal performance of the history, physical exam, discussion, and plan.      Scribe Attestation  By signing my name below, I, Rahel Noble   attest that this documentation has been prepared under the direction and in the presence of Marlo Benjamin MD.         [1] No Known Allergies

## (undated) DEVICE — SINGLE PORT MANIFOLD: Brand: NEPTUNE 2

## (undated) DEVICE — TUBING, SUCTION, 1/4" X 10', STRAIGHT: Brand: MEDLINE

## (undated) DEVICE — BRUSH ENDO COMBO

## (undated) DEVICE — TUBE SET 96 MM 64 MM H2O PERISTALTIC STD AUX CHANNEL

## (undated) DEVICE — Device: Brand: ENDO SMARTCAP